# Patient Record
Sex: MALE | Race: BLACK OR AFRICAN AMERICAN | NOT HISPANIC OR LATINO | URBAN - METROPOLITAN AREA
[De-identification: names, ages, dates, MRNs, and addresses within clinical notes are randomized per-mention and may not be internally consistent; named-entity substitution may affect disease eponyms.]

---

## 2017-09-15 ENCOUNTER — INPATIENT (INPATIENT)
Facility: HOSPITAL | Age: 62
LOS: 13 days | Discharge: ANOTHER HOSPITAL | End: 2017-09-29
Attending: INTERNAL MEDICINE | Admitting: INTERNAL MEDICINE
Payer: COMMERCIAL

## 2017-09-15 VITALS
HEART RATE: 75 BPM | RESPIRATION RATE: 17 BRPM | SYSTOLIC BLOOD PRESSURE: 139 MMHG | DIASTOLIC BLOOD PRESSURE: 94 MMHG | HEIGHT: 75 IN | OXYGEN SATURATION: 100 % | TEMPERATURE: 98 F | WEIGHT: 240.08 LBS

## 2017-09-15 DIAGNOSIS — Z98.890 OTHER SPECIFIED POSTPROCEDURAL STATES: Chronic | ICD-10-CM

## 2017-09-15 LAB
APTT BLD: 25.7 SEC — LOW (ref 27.5–37.4)
BASOPHILS # BLD AUTO: 0.1 K/UL — SIGNIFICANT CHANGE UP (ref 0–0.2)
BASOPHILS NFR BLD AUTO: 1.3 % — SIGNIFICANT CHANGE UP (ref 0–2)
EOSINOPHIL # BLD AUTO: 0.1 K/UL — SIGNIFICANT CHANGE UP (ref 0–0.5)
EOSINOPHIL NFR BLD AUTO: 1.4 % — SIGNIFICANT CHANGE UP (ref 0–6)
HCT VFR BLD CALC: 47.2 % — SIGNIFICANT CHANGE UP (ref 39–50)
HGB BLD-MCNC: 15.2 G/DL — SIGNIFICANT CHANGE UP (ref 13–17)
INR BLD: 1.04 RATIO — SIGNIFICANT CHANGE UP (ref 0.88–1.16)
LYMPHOCYTES # BLD AUTO: 1.8 K/UL — SIGNIFICANT CHANGE UP (ref 1–3.3)
LYMPHOCYTES # BLD AUTO: 25.4 % — SIGNIFICANT CHANGE UP (ref 13–44)
MCHC RBC-ENTMCNC: 32.2 GM/DL — SIGNIFICANT CHANGE UP (ref 32–36)
MCHC RBC-ENTMCNC: 32.2 PG — SIGNIFICANT CHANGE UP (ref 27–34)
MCV RBC AUTO: 100 FL — SIGNIFICANT CHANGE UP (ref 80–100)
MONOCYTES # BLD AUTO: 0.7 K/UL — SIGNIFICANT CHANGE UP (ref 0–0.9)
MONOCYTES NFR BLD AUTO: 9.6 % — SIGNIFICANT CHANGE UP (ref 2–14)
NEUTROPHILS # BLD AUTO: 4.4 K/UL — SIGNIFICANT CHANGE UP (ref 1.8–7.4)
NEUTROPHILS NFR BLD AUTO: 62.3 % — SIGNIFICANT CHANGE UP (ref 43–77)
PLATELET # BLD AUTO: 174 K/UL — SIGNIFICANT CHANGE UP (ref 150–400)
PROTHROM AB SERPL-ACNC: 11.3 SEC — SIGNIFICANT CHANGE UP (ref 9.8–12.7)
RBC # BLD: 4.72 M/UL — SIGNIFICANT CHANGE UP (ref 4.2–5.8)
RBC # FLD: 11.7 % — SIGNIFICANT CHANGE UP (ref 11–15)
WBC # BLD: 7 K/UL — SIGNIFICANT CHANGE UP (ref 3.8–10.5)
WBC # FLD AUTO: 7 K/UL — SIGNIFICANT CHANGE UP (ref 3.8–10.5)

## 2017-09-15 PROCEDURE — 99285 EMERGENCY DEPT VISIT HI MDM: CPT

## 2017-09-15 RX ORDER — MORPHINE SULFATE 50 MG/1
4 CAPSULE, EXTENDED RELEASE ORAL ONCE
Qty: 0 | Refills: 0 | Status: DISCONTINUED | OUTPATIENT
Start: 2017-09-15 | End: 2017-09-15

## 2017-09-15 RX ORDER — SODIUM CHLORIDE 9 MG/ML
2000 INJECTION INTRAMUSCULAR; INTRAVENOUS; SUBCUTANEOUS ONCE
Qty: 0 | Refills: 0 | Status: COMPLETED | OUTPATIENT
Start: 2017-09-15 | End: 2017-09-15

## 2017-09-15 RX ORDER — IOHEXOL 300 MG/ML
30 INJECTION, SOLUTION INTRAVENOUS ONCE
Qty: 0 | Refills: 0 | Status: COMPLETED | OUTPATIENT
Start: 2017-09-15 | End: 2017-09-15

## 2017-09-15 RX ORDER — SUCRALFATE 1 G
1 TABLET ORAL ONCE
Qty: 0 | Refills: 0 | Status: COMPLETED | OUTPATIENT
Start: 2017-09-15 | End: 2017-09-15

## 2017-09-15 RX ORDER — FAMOTIDINE 10 MG/ML
20 INJECTION INTRAVENOUS ONCE
Qty: 0 | Refills: 0 | Status: COMPLETED | OUTPATIENT
Start: 2017-09-15 | End: 2017-09-15

## 2017-09-15 RX ADMIN — IOHEXOL 30 MILLILITER(S): 300 INJECTION, SOLUTION INTRAVENOUS at 23:48

## 2017-09-15 RX ADMIN — MORPHINE SULFATE 4 MILLIGRAM(S): 50 CAPSULE, EXTENDED RELEASE ORAL at 23:55

## 2017-09-15 RX ADMIN — Medication 30 MILLILITER(S): at 23:48

## 2017-09-15 RX ADMIN — FAMOTIDINE 20 MILLIGRAM(S): 10 INJECTION INTRAVENOUS at 23:48

## 2017-09-15 RX ADMIN — MORPHINE SULFATE 4 MILLIGRAM(S): 50 CAPSULE, EXTENDED RELEASE ORAL at 23:48

## 2017-09-15 RX ADMIN — Medication 1 GRAM(S): at 23:48

## 2017-09-15 RX ADMIN — SODIUM CHLORIDE 2000 MILLILITER(S): 9 INJECTION INTRAMUSCULAR; INTRAVENOUS; SUBCUTANEOUS at 23:46

## 2017-09-15 NOTE — ED ADULT TRIAGE NOTE - CHIEF COMPLAINT QUOTE
epigastric abdominal pain x 3 days, pain radiated to back went to urgent today, hx hernia surgery around 7 years ago

## 2017-09-15 NOTE — ED ADULT NURSE NOTE - CHPI ED SYMPTOMS NEG
no vomiting/no nausea/no diarrhea/no hematuria/no blood in stool/no chills/no burning urination/no abdominal distension/no dysuria/no fever

## 2017-09-15 NOTE — ED ADULT NURSE NOTE - OBJECTIVE STATEMENT
Patient reports "3 days of epigastric pain that gets worse every time I eat."  Occasionally pain "Wraps around my Right side to back." Described as "dull."

## 2017-09-16 DIAGNOSIS — K21.9 GASTRO-ESOPHAGEAL REFLUX DISEASE WITHOUT ESOPHAGITIS: ICD-10-CM

## 2017-09-16 DIAGNOSIS — K85.90 ACUTE PANCREATITIS WITHOUT NECROSIS OR INFECTION, UNSPECIFIED: ICD-10-CM

## 2017-09-16 DIAGNOSIS — Z29.9 ENCOUNTER FOR PROPHYLACTIC MEASURES, UNSPECIFIED: ICD-10-CM

## 2017-09-16 LAB
ALBUMIN SERPL ELPH-MCNC: 3.1 G/DL — LOW (ref 3.3–5)
ALBUMIN SERPL ELPH-MCNC: 3.6 G/DL — SIGNIFICANT CHANGE UP (ref 3.3–5)
ALBUMIN SERPL ELPH-MCNC: 3.8 G/DL — SIGNIFICANT CHANGE UP (ref 3.3–5)
ALP SERPL-CCNC: 106 U/L — SIGNIFICANT CHANGE UP (ref 40–120)
ALP SERPL-CCNC: 116 U/L — SIGNIFICANT CHANGE UP (ref 40–120)
ALP SERPL-CCNC: 119 U/L — SIGNIFICANT CHANGE UP (ref 40–120)
ALT FLD-CCNC: 116 U/L — HIGH (ref 12–78)
ALT FLD-CCNC: 135 U/L — HIGH (ref 12–78)
ALT FLD-CCNC: 141 U/L — HIGH (ref 12–78)
ANION GAP SERPL CALC-SCNC: 6 MMOL/L — SIGNIFICANT CHANGE UP (ref 5–17)
ANION GAP SERPL CALC-SCNC: 8 MMOL/L — SIGNIFICANT CHANGE UP (ref 5–17)
ANION GAP SERPL CALC-SCNC: 9 MMOL/L — SIGNIFICANT CHANGE UP (ref 5–17)
APPEARANCE UR: CLEAR — SIGNIFICANT CHANGE UP
AST SERPL-CCNC: 115 U/L — HIGH (ref 15–37)
AST SERPL-CCNC: 79 U/L — HIGH (ref 15–37)
AST SERPL-CCNC: 91 U/L — HIGH (ref 15–37)
BILIRUB SERPL-MCNC: 1.4 MG/DL — HIGH (ref 0.2–1.2)
BILIRUB SERPL-MCNC: 1.5 MG/DL — HIGH (ref 0.2–1.2)
BILIRUB SERPL-MCNC: 1.5 MG/DL — HIGH (ref 0.2–1.2)
BILIRUB UR-MCNC: NEGATIVE — SIGNIFICANT CHANGE UP
BUN SERPL-MCNC: 10 MG/DL — SIGNIFICANT CHANGE UP (ref 7–23)
BUN SERPL-MCNC: 10 MG/DL — SIGNIFICANT CHANGE UP (ref 7–23)
BUN SERPL-MCNC: 8 MG/DL — SIGNIFICANT CHANGE UP (ref 7–23)
CALCIUM SERPL-MCNC: 7.9 MG/DL — LOW (ref 8.5–10.1)
CALCIUM SERPL-MCNC: 8.7 MG/DL — SIGNIFICANT CHANGE UP (ref 8.5–10.1)
CALCIUM SERPL-MCNC: 9 MG/DL — SIGNIFICANT CHANGE UP (ref 8.5–10.1)
CHLORIDE SERPL-SCNC: 106 MMOL/L — SIGNIFICANT CHANGE UP (ref 96–108)
CHLORIDE SERPL-SCNC: 107 MMOL/L — SIGNIFICANT CHANGE UP (ref 96–108)
CHLORIDE SERPL-SCNC: 110 MMOL/L — HIGH (ref 96–108)
CO2 SERPL-SCNC: 23 MMOL/L — SIGNIFICANT CHANGE UP (ref 22–31)
CO2 SERPL-SCNC: 26 MMOL/L — SIGNIFICANT CHANGE UP (ref 22–31)
CO2 SERPL-SCNC: 27 MMOL/L — SIGNIFICANT CHANGE UP (ref 22–31)
COLOR SPEC: YELLOW — SIGNIFICANT CHANGE UP
CREAT SERPL-MCNC: 1.08 MG/DL — SIGNIFICANT CHANGE UP (ref 0.5–1.3)
CREAT SERPL-MCNC: 1.1 MG/DL — SIGNIFICANT CHANGE UP (ref 0.5–1.3)
CREAT SERPL-MCNC: 1.14 MG/DL — SIGNIFICANT CHANGE UP (ref 0.5–1.3)
DIFF PNL FLD: NEGATIVE — SIGNIFICANT CHANGE UP
GLUCOSE SERPL-MCNC: 90 MG/DL — SIGNIFICANT CHANGE UP (ref 70–99)
GLUCOSE SERPL-MCNC: 91 MG/DL — SIGNIFICANT CHANGE UP (ref 70–99)
GLUCOSE SERPL-MCNC: 92 MG/DL — SIGNIFICANT CHANGE UP (ref 70–99)
GLUCOSE UR QL: NEGATIVE MG/DL — SIGNIFICANT CHANGE UP
HCT VFR BLD CALC: 43.7 % — SIGNIFICANT CHANGE UP (ref 39–50)
HGB BLD-MCNC: 14.2 G/DL — SIGNIFICANT CHANGE UP (ref 13–17)
KETONES UR-MCNC: NEGATIVE — SIGNIFICANT CHANGE UP
LACTATE SERPL-SCNC: 1.2 MMOL/L — SIGNIFICANT CHANGE UP (ref 0.7–2)
LEUKOCYTE ESTERASE UR-ACNC: NEGATIVE — SIGNIFICANT CHANGE UP
LIDOCAIN IGE QN: 1015 U/L — HIGH (ref 73–393)
LIDOCAIN IGE QN: 866 U/L — HIGH (ref 73–393)
MCHC RBC-ENTMCNC: 32.4 GM/DL — SIGNIFICANT CHANGE UP (ref 32–36)
MCHC RBC-ENTMCNC: 32.4 PG — SIGNIFICANT CHANGE UP (ref 27–34)
MCV RBC AUTO: 100.1 FL — HIGH (ref 80–100)
NITRITE UR-MCNC: NEGATIVE — SIGNIFICANT CHANGE UP
PH UR: 6.5 — SIGNIFICANT CHANGE UP (ref 5–8)
PLATELET # BLD AUTO: 178 K/UL — SIGNIFICANT CHANGE UP (ref 150–400)
POTASSIUM SERPL-MCNC: 3.8 MMOL/L — SIGNIFICANT CHANGE UP (ref 3.5–5.3)
POTASSIUM SERPL-MCNC: 4.3 MMOL/L — SIGNIFICANT CHANGE UP (ref 3.5–5.3)
POTASSIUM SERPL-MCNC: 5 MMOL/L — SIGNIFICANT CHANGE UP (ref 3.5–5.3)
POTASSIUM SERPL-SCNC: 3.8 MMOL/L — SIGNIFICANT CHANGE UP (ref 3.5–5.3)
POTASSIUM SERPL-SCNC: 4.3 MMOL/L — SIGNIFICANT CHANGE UP (ref 3.5–5.3)
POTASSIUM SERPL-SCNC: 5 MMOL/L — SIGNIFICANT CHANGE UP (ref 3.5–5.3)
PROT SERPL-MCNC: 6.5 GM/DL — SIGNIFICANT CHANGE UP (ref 6–8.3)
PROT SERPL-MCNC: 7.4 GM/DL — SIGNIFICANT CHANGE UP (ref 6–8.3)
PROT SERPL-MCNC: 7.9 GM/DL — SIGNIFICANT CHANGE UP (ref 6–8.3)
PROT UR-MCNC: NEGATIVE MG/DL — SIGNIFICANT CHANGE UP
RBC # BLD: 4.36 M/UL — SIGNIFICANT CHANGE UP (ref 4.2–5.8)
RBC # FLD: 11.6 % — SIGNIFICANT CHANGE UP (ref 11–15)
SODIUM SERPL-SCNC: 138 MMOL/L — SIGNIFICANT CHANGE UP (ref 135–145)
SODIUM SERPL-SCNC: 142 MMOL/L — SIGNIFICANT CHANGE UP (ref 135–145)
SODIUM SERPL-SCNC: 142 MMOL/L — SIGNIFICANT CHANGE UP (ref 135–145)
SP GR SPEC: 1 — LOW (ref 1.01–1.02)
UROBILINOGEN FLD QL: NEGATIVE MG/DL — SIGNIFICANT CHANGE UP
WBC # BLD: 6 K/UL — SIGNIFICANT CHANGE UP (ref 3.8–10.5)
WBC # FLD AUTO: 6 K/UL — SIGNIFICANT CHANGE UP (ref 3.8–10.5)

## 2017-09-16 PROCEDURE — 76700 US EXAM ABDOM COMPLETE: CPT | Mod: 26

## 2017-09-16 PROCEDURE — 99222 1ST HOSP IP/OBS MODERATE 55: CPT

## 2017-09-16 PROCEDURE — 12345: CPT | Mod: NC

## 2017-09-16 PROCEDURE — 74177 CT ABD & PELVIS W/CONTRAST: CPT | Mod: 26

## 2017-09-16 RX ORDER — SODIUM CHLORIDE 9 MG/ML
1000 INJECTION INTRAMUSCULAR; INTRAVENOUS; SUBCUTANEOUS
Qty: 0 | Refills: 0 | Status: DISCONTINUED | OUTPATIENT
Start: 2017-09-16 | End: 2017-09-21

## 2017-09-16 RX ORDER — PANTOPRAZOLE SODIUM 20 MG/1
40 TABLET, DELAYED RELEASE ORAL DAILY
Qty: 0 | Refills: 0 | Status: DISCONTINUED | OUTPATIENT
Start: 2017-09-16 | End: 2017-09-29

## 2017-09-16 RX ORDER — MORPHINE SULFATE 50 MG/1
2 CAPSULE, EXTENDED RELEASE ORAL EVERY 4 HOURS
Qty: 0 | Refills: 0 | Status: DISCONTINUED | OUTPATIENT
Start: 2017-09-16 | End: 2017-09-21

## 2017-09-16 RX ADMIN — PANTOPRAZOLE SODIUM 40 MILLIGRAM(S): 20 TABLET, DELAYED RELEASE ORAL at 04:19

## 2017-09-16 RX ADMIN — SODIUM CHLORIDE 100 MILLILITER(S): 9 INJECTION INTRAMUSCULAR; INTRAVENOUS; SUBCUTANEOUS at 04:14

## 2017-09-16 RX ADMIN — MORPHINE SULFATE 2 MILLIGRAM(S): 50 CAPSULE, EXTENDED RELEASE ORAL at 04:23

## 2017-09-16 RX ADMIN — PANTOPRAZOLE SODIUM 40 MILLIGRAM(S): 20 TABLET, DELAYED RELEASE ORAL at 11:44

## 2017-09-16 RX ADMIN — MORPHINE SULFATE 2 MILLIGRAM(S): 50 CAPSULE, EXTENDED RELEASE ORAL at 04:19

## 2017-09-16 RX ADMIN — SODIUM CHLORIDE 100 MILLILITER(S): 9 INJECTION INTRAMUSCULAR; INTRAVENOUS; SUBCUTANEOUS at 20:54

## 2017-09-16 NOTE — ED PROVIDER NOTE - MEDICAL DECISION MAKING DETAILS
Patient presnt eith epigastric pain.  VSS.  lab values c/w pancreatitis.  CT scan demonstrates no acute pathology. Patient still with pain.  d/w Dr. Fischer and will admit. Uneventful ED observation period. Non toxic.

## 2017-09-16 NOTE — CHART NOTE - NSCHARTNOTEFT_GEN_A_CORE
patient with no past medical history except daily 32 ounce beer drinker presents with mild pancreatits with n o further vomiting or abdominal pain . ct confirms pancreatitis and us of gallbladder was NEGATIVE for stones. I told patient that he should be abstinent from alchohol as it is the most likely cause of the pancreatitis

## 2017-09-16 NOTE — H&P ADULT - PROBLEM SELECTOR PLAN 1
admit to medicine  iv hydration  npo  gi consult  pt counseled on etoh cessation and pt agrees  Abdominal US  no indication for abx

## 2017-09-16 NOTE — H&P ADULT - NSHPPHYSICALEXAM_GEN_ALL_CORE
Vital Signs Last 24 Hrs  T(C): 36.6 (16 Sep 2017 03:46), Max: 36.7 (15 Sep 2017 21:38)  T(F): 97.9 (16 Sep 2017 03:46), Max: 98 (15 Sep 2017 21:38)  HR: 65 (16 Sep 2017 03:46) (65 - 81)  BP: 136/76 (16 Sep 2017 03:46) (136/76 - 155/99)  BP(mean): --  RR: 17 (16 Sep 2017 03:46) (17 - 18)  SpO2: 95% (16 Sep 2017 03:46) (95% - 100%)    PHYSICAL EXAM:    GENERAL: NAD, well-groomed, well-developed  HEAD:  Atraumatic, Normocephalic  EYES: EOMI, PERRLA, conjunctiva and sclera clear  ENMT: No tonsillar erythema, exudates, or enlargement; Moist mucous membranes, No lesions  NECK: Supple, No JVD, Normal thyroid  NERVOUS SYSTEM:  Alert & Oriented X3, Good concentration; Motor Strength 5/5 B/L upper and lower extremities; DTRs 2+ intact and symmetric  CHEST/LUNG: Clear to percussion bilaterally; No rales, rhonchi, wheezing, or rubs  HEART: Regular rate and rhythm; No rubs, or gallops, +S1,S2  ABDOMEN: Soft, mild tenderness ruq and epigastric +wyatt, no rebound or guarding  EXTREMITIES:  2+ Peripheral Pulses, No clubbing, cyanosis, or edema  LYMPH: No cervical adenopathy  RECTAL: deferred  BREAST: No palpatble masses, skin no lesions   : deferred  SKIN: No rashes or lesions

## 2017-09-16 NOTE — ED PROVIDER NOTE - PHYSICAL EXAMINATION
Gen: Alert, NAD Head: NC, AT, PERRL, EOMI, normal lids/conjunctiva ENT: normal hearing, patent oropharynx without erythema/exudate, uvula midline  Neck: +supple, no tenderness/meningismus/JVD, +Trachea midlinePulm: Bilateral BS, normal resp effort, no wheeze/stridor/retractions CV: RRR, no M/R/G, +dist pulses  Abd: soft,TTP epigastrium, otherwise  NT/ND, +BS, no hepatosplenomegalyMskel: no edema/erythema/cyanosis  Skin: no rash Neuro: AAOx3, no sensory/motor deficits, CN 2-12 intact

## 2017-09-16 NOTE — H&P ADULT - NSHPLABSRESULTS_GEN_ALL_CORE
LABS:                        15.2   7.0   )-----------( 174      ( 15 Sep 2017 23:44 )             47.2     09-16    142  |  106  |  10  ----------------------------<  92  4.3   |  27  |  1.14    Ca    8.7      16 Sep 2017 00:12    TPro  7.4  /  Alb  3.8  /  TBili  1.4<H>  /  DBili  x   /  AST  91<H>  /  ALT  135<H>  /  AlkPhos  116  09-16    PT/INR - ( 15 Sep 2017 23:44 )   PT: 11.3 sec;   INR: 1.04 ratio         PTT - ( 15 Sep 2017 23:44 )  PTT:25.7 sec    CAPILLARY BLOOD GLUCOSE          RADIOLOGY & ADDITIONAL TESTS:    Imaging Personally Reviewed:  [ X] YES  [ ] NO

## 2017-09-16 NOTE — H&P ADULT - HISTORY OF PRESENT ILLNESS
Pt. is a 63 y/o w/o significant pmhx except mild gerd, was in usoh till 2 days ago started to develop r-sided and epigastric pain exacerbated by food.  sx's were getting worse, pt denies such sx's in past.  Does report hx of gerd but those sx's very different and usually very mild states now was also getting pain radiating to r back at times which made him seek care.  In ed has lipase of 1015.   Pt denies  any fever, chills, sob, cp palpitations n/v/d/c. no sick contacts or travels. pt does report etoh ~2 beers qd (32oz) states occasionally more, but never drunk.

## 2017-09-16 NOTE — ED PROVIDER NOTE - OBJECTIVE STATEMENT
Pertinent PMH/PSH/FHx/SHx and Review of Systems contained within:  Patient presents to the ED for epgigastric pain.  PMH of right pneumo decades ago, GERD. VSS.  Sx for 2 days.  worsened with food.  otherwise baseline.  ???EtOH??? no intox today. family bedside.  otherwsei well.  Non toxic.  Well appearing. No aggravating or relieving factors. No other pertinent PMH.  No other pertinent PSH.  No other pertinent FHx.  Patient denies EtOH/tobacco/illicit substance use. No fever/chills, No photophobia/eye pain/changes in vision, No ear pain/sore throat/dysphagia, No chest pain/palpitations, no SOB/cough/wheeze/stridor, No N/V/D, no dysuria/frequency/discharge, No neck/back pain, no rash, no changes in neurological status/function.

## 2017-09-17 LAB — LIDOCAIN IGE QN: 840 U/L — HIGH (ref 73–393)

## 2017-09-17 PROCEDURE — 99233 SBSQ HOSP IP/OBS HIGH 50: CPT

## 2017-09-17 RX ADMIN — MORPHINE SULFATE 2 MILLIGRAM(S): 50 CAPSULE, EXTENDED RELEASE ORAL at 17:37

## 2017-09-17 RX ADMIN — SODIUM CHLORIDE 100 MILLILITER(S): 9 INJECTION INTRAMUSCULAR; INTRAVENOUS; SUBCUTANEOUS at 06:51

## 2017-09-17 RX ADMIN — PANTOPRAZOLE SODIUM 40 MILLIGRAM(S): 20 TABLET, DELAYED RELEASE ORAL at 12:22

## 2017-09-17 RX ADMIN — MORPHINE SULFATE 2 MILLIGRAM(S): 50 CAPSULE, EXTENDED RELEASE ORAL at 17:55

## 2017-09-17 NOTE — PROGRESS NOTE ADULT - PROBLEM SELECTOR PLAN 1
admit to medicine  iv hydration  npo  gi consult  pt counseled on etoh cessation and pt agrees  Abdominal US  no indication for antibiotics  analgesics

## 2017-09-17 NOTE — PROGRESS NOTE ADULT - SUBJECTIVE AND OBJECTIVE BOX
Pt feels a little better - c/o mid -epigastric tenderness      MEDICATIONS  (STANDING):  pantoprazole  Injectable 40 milliGRAM(s) IV Push daily  sodium chloride 0.9%. 1000 milliLiter(s) (100 mL/Hr) IV Continuous <Continuous>    MEDICATIONS  (PRN):  morphine  - Injectable 2 milliGRAM(s) IV Push every 4 hours PRN Moderate Pain (4 - 6)      Allergies    Drug Allergies Not Recorded  dust (Sneezing)    Intolerances        Vital Signs Last 24 Hrs  T(C): 36.6 (17 Sep 2017 05:20), Max: 37 (16 Sep 2017 23:20)  T(F): 97.8 (17 Sep 2017 05:20), Max: 98.6 (16 Sep 2017 23:20)  HR: 69 (17 Sep 2017 05:20) (65 - 73)  BP: 141/92 (17 Sep 2017 05:20) (135/85 - 149/88)  BP(mean): --  RR: 16 (17 Sep 2017 05:20) (16 - 16)  SpO2: 99% (17 Sep 2017 05:20) (97% - 99%)    PHYSICAL EXAM:  General: NAD.  CVS: S1, S2  Chest: air entry bilaterally present  Abd: BS present, soft, non-tender      LABS:                        14.2   6.0   )-----------( 178      ( 16 Sep 2017 05:12 )             43.7     09-16    142  |  110<H>  |  8   ----------------------------<  91  3.8   |  26  |  1.08    Ca    7.9<L>      16 Sep 2017 05:12    TPro  6.5  /  Alb  3.1<L>  /  TBili  1.5<H>  /  DBili  x   /  AST  79<H>  /  ALT  116<H>  /  AlkPhos  106  09-16    PT/INR - ( 15 Sep 2017 23:44 )   PT: 11.3 sec;   INR: 1.04 ratio         PTT - ( 15 Sep 2017 23:44 )  PTT:25.7 sec    Lipase, Serum: 840 U/L (09.17.17 @ 06:37)      on clear liquids  protonix  lipase in am

## 2017-09-17 NOTE — PROGRESS NOTE ADULT - SUBJECTIVE AND OBJECTIVE BOX
CHIEF COMPLAINT/INTERVAL HISTORY:    Patient is a 62y old  Male who presents with a chief complaint of abdominal pain (16 Sep 2017 04:00)      HPI:  Pt. is a 61 y/o w/o significant pmhx except mild gerd, was in usoh till 2 days ago started to develop r-sided and epigastric pain exacerbated by food.  sx's were getting worse, pt denies such sx's in past.  Does report hx of gerd but those sx's very different and usually very mild states now was also getting pain radiating to r back at times which made him seek care.  In ed has lipase of 1015.   Pt denies  any fever, chills, sob, cp palpitations n/v/d/c. no sick contacts or travels. pt does report etoh ~2 beers qd (32oz) states occasionally more, but never drunk. (16 Sep 2017 04:00)    Overnight issues  clinically improved  still with some pain   SUBJECTIVE & OBJECTIVE: Pt seen and examined at bedside.   ROS:  CONSTITUTIONAL: No fever, weight loss, or fatigue  NECK: No pain or stiffness  RESPIRATORY: No cough, wheezing, chills or hemoptysis; No shortness of breath  CARDIOVASCULAR: No chest pain, palpitations, dizziness, or leg swelling  GASTROINTESTINAL: midepigastric pain. No nausea, vomiting, or hematemesis; No diarrhea or constipation. No melena or hematochezia.  GENITOURINARY: No dysuria, frequency, hematuria, or incontinence  NEUROLOGICAL: No headaches, memory loss, loss of strength, numbness, or tremors  SKIN: No itching, burning, rashes, or lesions   ICU Vital Signs Last 24 Hrs  T(C): 36.7 (17 Sep 2017 11:10), Max: 37 (16 Sep 2017 23:20)  T(F): 98 (17 Sep 2017 11:10), Max: 98.6 (16 Sep 2017 23:20)  HR: 66 (17 Sep 2017 11:10) (65 - 73)  BP: 147/84 (17 Sep 2017 11:10) (135/85 - 149/88)  BP(mean): --  ABP: --  ABP(mean): --  RR: 16 (17 Sep 2017 11:10) (16 - 16)  SpO2: 95% (17 Sep 2017 11:10) (95% - 99%)        MEDICATIONS  (STANDING):  pantoprazole  Injectable 40 milliGRAM(s) IV Push daily  sodium chloride 0.9%. 1000 milliLiter(s) (100 mL/Hr) IV Continuous <Continuous>    MEDICATIONS  (PRN):  morphine  - Injectable 2 milliGRAM(s) IV Push every 4 hours PRN Moderate Pain (4 - 6)        PHYSICAL EXAM:    GENERAL: NAD, well-groomed, well-developed  HEAD:  Atraumatic, Normocephalic  EYES: EOMI, PERRLA, conjunctiva and sclera clear  ENMT: Moist mucous membranes  NECK: Supple, No JVD  NERVOUS SYSTEM:  Alert & Oriented X3, Motor Strength 5/5 B/L upper and lower extremities; DTRs 2+ intact and symmetric  CHEST/LUNG: Clear to auscultation bilaterally; No rales, rhonchi, wheezing, or rubs  HEART: Regular rate and rhythm; No murmurs, rubs, or gallops  ABDOMEN: Soft, Nontender, Nondistended; Bowel sounds present  EXTREMITIES:  2+ Peripheral Pulses, No clubbing, cyanosis, or edema    LABS:                        14.2   6.0   )-----------( 178      ( 16 Sep 2017 05:12 )             43.7         142  |  110<H>  |  8   ----------------------------<  91  3.8   |  26  |  1.08    Ca    7.9<L>      16 Sep 2017 05:12    TPro  6.5  /  Alb  3.1<L>  /  TBili  1.5<H>  /  DBili  x   /  AST  79<H>  /  ALT  116<H>  /  AlkPhos  106      PT/INR - ( 15 Sep 2017 23:44 )   PT: 11.3 sec;   INR: 1.04 ratio         PTT - ( 15 Sep 2017 23:44 )  PTT:25.7 sec  Urinalysis Basic - ( 16 Sep 2017 05:12 )    Color: Yellow / Appearance: Clear / S.005 / pH: x  Gluc: x / Ketone: Negative  / Bili: Negative / Urobili: Negative mg/dL   Blood: x / Protein: Negative mg/dL / Nitrite: Negative   Leuk Esterase: Negative / RBC: x / WBC x   Sq Epi: x / Non Sq Epi: x / Bacteria: x        CAPILLARY BLOOD GLUCOSE          RECENT CULTURES:      RADIOLOGY & ADDITIONAL TESTS:  Imaging Personally Reviewed:  [ ] YES      Consultant(s) Notes Reviewed:  [ ] YES     Care Discussed with [ ] Consultants [X ] Patient [ ] Family  [x ]    [x ]  Other; RN  HEALTH ISSUES - PROBLEM Dx:  Preventive measure: Preventive measure  Gastroesophageal reflux disease without esophagitis: Gastroesophageal reflux disease without esophagitis  Acute pancreatitis, unspecified complication status, unspecified pancreatitis type: Acute pancreatitis, unspecified complication status, unspecified pancreatitis type        DVT/GI ppx  Discussed with pt @ bedside

## 2017-09-18 LAB — LIDOCAIN IGE QN: 1001 U/L — HIGH (ref 73–393)

## 2017-09-18 PROCEDURE — 99233 SBSQ HOSP IP/OBS HIGH 50: CPT

## 2017-09-18 RX ADMIN — PANTOPRAZOLE SODIUM 40 MILLIGRAM(S): 20 TABLET, DELAYED RELEASE ORAL at 11:13

## 2017-09-18 NOTE — PROGRESS NOTE ADULT - SUBJECTIVE AND OBJECTIVE BOX
Pt feeling much better  improved on protonix    MEDICATIONS  (STANDING):  pantoprazole  Injectable 40 milliGRAM(s) IV Push daily  sodium chloride 0.9%. 1000 milliLiter(s) (100 mL/Hr) IV Continuous <Continuous>    MEDICATIONS  (PRN):  morphine  - Injectable 2 milliGRAM(s) IV Push every 4 hours PRN Moderate Pain (4 - 6)      Allergies    Drug Allergies Not Recorded  dust (Sneezing)    Intolerances        Vital Signs Last 24 Hrs  T(C): 37.1 (18 Sep 2017 05:20), Max: 37.1 (18 Sep 2017 05:20)  T(F): 98.8 (18 Sep 2017 05:20), Max: 98.8 (18 Sep 2017 05:20)  HR: 71 (18 Sep 2017 05:20) (66 - 76)  BP: 136/83 (18 Sep 2017 05:20) (132/89 - 147/84)  BP(mean): --  RR: 16 (18 Sep 2017 05:20) (16 - 17)  SpO2: 98% (18 Sep 2017 05:20) (95% - 99%)    PHYSICAL EXAM:  General: NAD.  CVS: S1, S2  Chest: air entry bilaterally present  Abd: BS present, soft, mild mid epig tenderness      LABS:    Lipase, Serum: 1001 U/L (09.18.17 @ 06:14)    on clear liquids  follow lipase

## 2017-09-18 NOTE — PROGRESS NOTE ADULT - SUBJECTIVE AND OBJECTIVE BOX
CHIEF COMPLAINT/INTERVAL HISTORY:    Patient is a 62y old  Male who presents with a chief complaint of abdominal pain (16 Sep 2017 04:00)      HPI:  Pt. is a 61 y/o w/o significant pmhx except mild gerd, was in usoh till 2 days ago started to develop r-sided and epigastric pain exacerbated by food.  sx's were getting worse, pt denies such sx's in past.  Does report hx of gerd but those sx's very different and usually very mild states now was also getting pain radiating to r back at times which made him seek care.  In ed has lipase of 1015.   Pt denies  any fever, chills, sob, cp palpitations n/v/d/c. no sick contacts or travels. pt does report etoh ~2 beers qd (32oz) states occasionally more, but never drunk. (16 Sep 2017 04:00)    Overnight issues  still with mild pain and tenderness and increased lipase   SUBJECTIVE & OBJECTIVE: Pt seen and examined at bedside.   ROS:  CONSTITUTIONAL: No fever, weight loss, or fatigue  NECK: No pain or stiffness  RESPIRATORY: No cough, wheezing, chills or hemoptysis; No shortness of breath  CARDIOVASCULAR: No chest pain, palpitations, dizziness, or leg swelling  GASTROINTESTINAL: moderate midepigastric pain. No nausea, vomiting, or hematemesis; No diarrhea or constipation. No melena or hematochezia.  GENITOURINARY: No dysuria, frequency, hematuria, or incontinence  NEUROLOGICAL: No headaches, memory loss, loss of strength, numbness, or tremors  SKIN: No itching, burning, rashes, or lesions   ICU Vital Signs Last 24 Hrs  T(C): 36.9 (18 Sep 2017 11:42), Max: 37.1 (18 Sep 2017 05:20)  T(F): 98.4 (18 Sep 2017 11:42), Max: 98.8 (18 Sep 2017 05:20)  HR: 71 (18 Sep 2017 11:42) (68 - 76)  BP: 127/83 (18 Sep 2017 11:42) (127/83 - 143/87)  BP(mean): --  ABP: --  ABP(mean): --  RR: 19 (18 Sep 2017 11:42) (16 - 19)  SpO2: 99% (18 Sep 2017 11:42) (98% - 99%)        MEDICATIONS  (STANDING):  pantoprazole  Injectable 40 milliGRAM(s) IV Push daily  sodium chloride 0.9%. 1000 milliLiter(s) (100 mL/Hr) IV Continuous <Continuous>    MEDICATIONS  (PRN):  morphine  - Injectable 2 milliGRAM(s) IV Push every 4 hours PRN Moderate Pain (4 - 6)        PHYSICAL EXAM:    GENERAL: NAD, well-groomed, well-developed  HEAD:  Atraumatic, Normocephalic  EYES: EOMI, PERRLA, conjunctiva and sclera clear  ENMT: Moist mucous membranes  NECK: Supple, No JVD  NERVOUS SYSTEM:  Alert & Oriented X3, Motor Strength 5/5 B/L upper and lower extremities; DTRs 2+ intact and symmetric  CHEST/LUNG: Clear to auscultation bilaterally; No rales, rhonchi, wheezing, or rubs  HEART: Regular rate and rhythm; No murmurs, rubs, or gallops  ABDOMEN: Soft, mild tenderness midepigastric area  Nondistended; Bowel sounds present  EXTREMITIES:  2+ Peripheral Pulses, No clubbing, cyanosis, or edema    LABS:                CAPILLARY BLOOD GLUCOSE          RECENT CULTURES:      RADIOLOGY & ADDITIONAL TESTS:  Imaging Personally Reviewed:  [ ] YES      Consultant(s) Notes Reviewed:  [ ] YES     Care Discussed with [ ] Consultants [X ] Patient [ ] Family  [x ]    [x ]  Other; RN  HEALTH ISSUES - PROBLEM Dx:  Preventive measure: Preventive measure  Gastroesophageal reflux disease without esophagitis: Gastroesophageal reflux disease without esophagitis  Acute pancreatitis, unspecified complication status, unspecified pancreatitis type: Acute pancreatitis, unspecified complication status, unspecified pancreatitis type        DVT/GI ppx  Discussed with pt @ bedside

## 2017-09-19 LAB
ANION GAP SERPL CALC-SCNC: 9 MMOL/L — SIGNIFICANT CHANGE UP (ref 5–17)
BUN SERPL-MCNC: 6 MG/DL — LOW (ref 7–23)
CALCIUM SERPL-MCNC: 8.9 MG/DL — SIGNIFICANT CHANGE UP (ref 8.5–10.1)
CHLORIDE SERPL-SCNC: 106 MMOL/L — SIGNIFICANT CHANGE UP (ref 96–108)
CO2 SERPL-SCNC: 26 MMOL/L — SIGNIFICANT CHANGE UP (ref 22–31)
CREAT SERPL-MCNC: 1.05 MG/DL — SIGNIFICANT CHANGE UP (ref 0.5–1.3)
GLUCOSE SERPL-MCNC: 91 MG/DL — SIGNIFICANT CHANGE UP (ref 70–99)
LIDOCAIN IGE QN: 1278 U/L — HIGH (ref 73–393)
POTASSIUM SERPL-MCNC: 3.7 MMOL/L — SIGNIFICANT CHANGE UP (ref 3.5–5.3)
POTASSIUM SERPL-SCNC: 3.7 MMOL/L — SIGNIFICANT CHANGE UP (ref 3.5–5.3)
SODIUM SERPL-SCNC: 141 MMOL/L — SIGNIFICANT CHANGE UP (ref 135–145)

## 2017-09-19 PROCEDURE — 99231 SBSQ HOSP IP/OBS SF/LOW 25: CPT

## 2017-09-19 RX ORDER — SODIUM CHLORIDE 9 MG/ML
1000 INJECTION INTRAMUSCULAR; INTRAVENOUS; SUBCUTANEOUS ONCE
Qty: 0 | Refills: 0 | Status: COMPLETED | OUTPATIENT
Start: 2017-09-19 | End: 2017-09-19

## 2017-09-19 RX ORDER — SODIUM CHLORIDE 9 MG/ML
1000 INJECTION INTRAMUSCULAR; INTRAVENOUS; SUBCUTANEOUS
Qty: 0 | Refills: 0 | Status: DISCONTINUED | OUTPATIENT
Start: 2017-09-19 | End: 2017-09-21

## 2017-09-19 RX ADMIN — SODIUM CHLORIDE 2000 MILLILITER(S): 9 INJECTION INTRAMUSCULAR; INTRAVENOUS; SUBCUTANEOUS at 22:46

## 2017-09-19 RX ADMIN — PANTOPRAZOLE SODIUM 40 MILLIGRAM(S): 20 TABLET, DELAYED RELEASE ORAL at 12:24

## 2017-09-19 NOTE — PROGRESS NOTE ADULT - PROBLEM SELECTOR PLAN 1
admit to medicine  iv hydration  npo  gi consult  pt counseled on etoh cessation and pt agrees  Abdominal US noted   ct scan abd noted    mild rising of lipase    Gi consult  noted iv hydration  npo  gi consult noted    abd pain improved but GI concerned about elevation in lipase although clinically no abd pain. request pt to be npo for today and follow up labs in am     pt counseled on etoh cessation and pt agrees  Abdominal US noted   ct scan abd noted    mild rising of lipase    Gi consult  noted

## 2017-09-19 NOTE — PROGRESS NOTE ADULT - SUBJECTIVE AND OBJECTIVE BOX
Pt feeling much better - denies abd pain    MEDICATIONS  (STANDING):  pantoprazole  Injectable 40 milliGRAM(s) IV Push daily  sodium chloride 0.9%. 1000 milliLiter(s) (100 mL/Hr) IV Continuous <Continuous>    MEDICATIONS  (PRN):  morphine  - Injectable 2 milliGRAM(s) IV Push every 4 hours PRN Moderate Pain (4 - 6)      Allergies    Drug Allergies Not Recorded  dust (Sneezing)    Intolerances        Vital Signs Last 24 Hrs  T(C): 36.3 (19 Sep 2017 11:23), Max: 37.1 (18 Sep 2017 23:30)  T(F): 97.3 (19 Sep 2017 11:23), Max: 98.8 (18 Sep 2017 23:30)  HR: 71 (19 Sep 2017 11:23) (68 - 85)  BP: 137/83 (19 Sep 2017 11:23) (112/69 - 137/83)  BP(mean): --  RR: 17 (19 Sep 2017 11:23) (16 - 19)  SpO2: 97% (19 Sep 2017 11:23) (96% - 100%)    PHYSICAL EXAM:  General: NAD.  CVS: S1, S2  Chest: air entry bilaterally present  Abd: BS present, soft, non-tender      LABS:    09-19    141  |  106  |  6<L>  ----------------------------<  91  3.7   |  26  |  1.05    Ca    8.9      19 Sep 2017 06:27      Lipase, Serum: 1278 U/L (09.19.17 @ 06:27)  will change diet to NPO - concerned about rising Lipase

## 2017-09-19 NOTE — PROGRESS NOTE ADULT - SUBJECTIVE AND OBJECTIVE BOX
Patient is a 62y old  Male who presents with a chief complaint of abdominal pain (16 Sep 2017 04:00)      OVERNIGHT EVENTS:      REVIEW OF SYSTEMS: denies chest pain/SOB, diaphoresis, no F/C, cough, dizziness, headache, blurry vision, nausea, vomiting, abdominal pain. Rest unremarkable     MEDICATIONS  (STANDING):  pantoprazole  Injectable 40 milliGRAM(s) IV Push daily  sodium chloride 0.9%. 1000 milliLiter(s) (100 mL/Hr) IV Continuous <Continuous>    MEDICATIONS  (PRN):  morphine  - Injectable 2 milliGRAM(s) IV Push every 4 hours PRN Moderate Pain (4 - 6)      Allergies    Drug Allergies Not Recorded  dust (Sneezing)    Intolerances        SUBJECTIVE: in bed in NAD, no acute events overnight     T(F): 97.3 (09-19-17 @ 11:23), Max: 98.8 (09-18-17 @ 23:30)  HR: 71 (09-19-17 @ 11:23) (68 - 85)  BP: 137/83 (09-19-17 @ 11:23) (112/69 - 137/83)  RR: 17 (09-19-17 @ 11:23) (16 - 18)  SpO2: 97% (09-19-17 @ 11:23) (96% - 100%)  Wt(kg): --    PHYSICAL EXAM:  GENERAL: NAD, well-groomed, well-developed  HEAD:  Atraumatic, Normocephalic  EYES: EOMI, PERRLA, conjunctiva and sclera clear  ENMT: No tonsillar erythema, exudates, or enlargement; Moist mucous membranes, Good dentition, No lesions  NECK: Supple, No JVD, Normal thyroid  CHEST/LUNG: Clear to  auscultation bilaterally; No rales, rhonchi, wheezing, or rubs  bilaterally  HEART: Regular rate and rhythm; No murmurs, rubs, or gallops  ABDOMEN: Soft, Nontender, Nondistended; Bowel sounds present  EXTREMITIES:  2+ Peripheral Pulses, No clubbing, cyanosis, or edema BL LE  LYMPH: No lymphadenopathy noted  SKIN: No rashes or lesions  NERVOUS SYSTEM:  Alert & Oriented X3, Good concentration; Motor Strength 5/5 B/L upper and lower extremities;   DTRs 2+ intact and symmetric, sensation intact BL    LABS:    09-19    141  |  106  |  6<L>  ----------------------------<  91  3.7   |  26  |  1.05    Ca    8.9      19 Sep 2017 06:27    Lipase, Serum in AM (09.19.17 @ 06:27)    Lipase, Serum: 1278 U/L          Cultures;   CAPILLARY BLOOD GLUCOSE        Lipid panel:           RADIOLOGY & ADDITIONAL TESTS:    < from: US Abdomen Complete (09.16.17 @ 11:11) >  IMPRESSION:    Contracted gallbladder.  No biliary ductal dilatation.          < from: CT Abdomen and Pelvis w/ Oral Cont and w/ IV Cont (09.16.17 @ 02:08) >  IMPRESSION:    No CT evidence of pancreatitis.              Imaging Personally Reviewed:  [ ] YES      Consultant(s) Notes Reviewed:  [ ] YES     Care Discussed with [ ] Consultants [X ] Patient [ ] Family  [x ]    [x ]  Other; RN

## 2017-09-20 LAB
ALBUMIN SERPL ELPH-MCNC: 3.1 G/DL — LOW (ref 3.3–5)
ALP SERPL-CCNC: 286 U/L — HIGH (ref 40–120)
ALT FLD-CCNC: 137 U/L — HIGH (ref 12–78)
ANION GAP SERPL CALC-SCNC: 9 MMOL/L — SIGNIFICANT CHANGE UP (ref 5–17)
AST SERPL-CCNC: 79 U/L — HIGH (ref 15–37)
BILIRUB SERPL-MCNC: 2.3 MG/DL — HIGH (ref 0.2–1.2)
BUN SERPL-MCNC: 8 MG/DL — SIGNIFICANT CHANGE UP (ref 7–23)
CALCIUM SERPL-MCNC: 8.7 MG/DL — SIGNIFICANT CHANGE UP (ref 8.5–10.1)
CHLORIDE SERPL-SCNC: 108 MMOL/L — SIGNIFICANT CHANGE UP (ref 96–108)
CO2 SERPL-SCNC: 24 MMOL/L — SIGNIFICANT CHANGE UP (ref 22–31)
CREAT SERPL-MCNC: 1.26 MG/DL — SIGNIFICANT CHANGE UP (ref 0.5–1.3)
GLUCOSE SERPL-MCNC: 75 MG/DL — SIGNIFICANT CHANGE UP (ref 70–99)
LIDOCAIN IGE QN: 1403 U/L — HIGH (ref 73–393)
MAGNESIUM SERPL-MCNC: 2.3 MG/DL — SIGNIFICANT CHANGE UP (ref 1.6–2.6)
PHOSPHATE SERPL-MCNC: 2.8 MG/DL — SIGNIFICANT CHANGE UP (ref 2.5–4.5)
POTASSIUM SERPL-MCNC: 3.9 MMOL/L — SIGNIFICANT CHANGE UP (ref 3.5–5.3)
POTASSIUM SERPL-SCNC: 3.9 MMOL/L — SIGNIFICANT CHANGE UP (ref 3.5–5.3)
PROT SERPL-MCNC: 7.2 GM/DL — SIGNIFICANT CHANGE UP (ref 6–8.3)
SODIUM SERPL-SCNC: 141 MMOL/L — SIGNIFICANT CHANGE UP (ref 135–145)

## 2017-09-20 PROCEDURE — 74183 MRI ABD W/O CNTR FLWD CNTR: CPT | Mod: 26

## 2017-09-20 PROCEDURE — 99231 SBSQ HOSP IP/OBS SF/LOW 25: CPT

## 2017-09-20 RX ADMIN — SODIUM CHLORIDE 100 MILLILITER(S): 9 INJECTION INTRAMUSCULAR; INTRAVENOUS; SUBCUTANEOUS at 12:16

## 2017-09-20 RX ADMIN — SODIUM CHLORIDE 100 MILLILITER(S): 9 INJECTION INTRAMUSCULAR; INTRAVENOUS; SUBCUTANEOUS at 00:06

## 2017-09-20 RX ADMIN — PANTOPRAZOLE SODIUM 40 MILLIGRAM(S): 20 TABLET, DELAYED RELEASE ORAL at 12:11

## 2017-09-20 NOTE — DIETITIAN INITIAL EVALUATION ADULT. - NS AS NUTRI INTERV ED CONTENT
assist the pt. in understanding the role of the pancreas and possible triggers for pancreatitis./Nutrition relationship to health/disease/Purpose of the nutrition education

## 2017-09-20 NOTE — PROGRESS NOTE ADULT - SUBJECTIVE AND OBJECTIVE BOX
Patient is a 62y old  Male who presents with a chief complaint of abdominal pain (16 Sep 2017 04:00)      OVERNIGHT EVENTS:      REVIEW OF SYSTEMS: denies chest pain/SOB, diaphoresis, no F/C, cough, dizziness, headache, blurry vision, nausea, vomiting, abdominal pain. Rest unremarkable     MEDICATIONS  (STANDING):  pantoprazole  Injectable 40 milliGRAM(s) IV Push daily  sodium chloride 0.9%. 1000 milliLiter(s) (100 mL/Hr) IV Continuous <Continuous>    MEDICATIONS  (PRN):  morphine  - Injectable 2 milliGRAM(s) IV Push every 4 hours PRN Moderate Pain (4 - 6)      Allergies    Drug Allergies Not Recorded  dust (Sneezing)    Intolerances        SUBJECTIVE: in bed in NAD, no acute events overnight     T(F): 97.3 (09-19-17 @ 11:23), Max: 98.8 (09-18-17 @ 23:30)  HR: 71 (09-19-17 @ 11:23) (68 - 85)  BP: 137/83 (09-19-17 @ 11:23) (112/69 - 137/83)  RR: 17 (09-19-17 @ 11:23) (16 - 18)  SpO2: 97% (09-19-17 @ 11:23) (96% - 100%)  Wt(kg): --    PHYSICAL EXAM:  GENERAL: NAD, well-groomed, well-developed  HEAD:  Atraumatic, Normocephalic  EYES: EOMI, PERRLA, conjunctiva and sclera clear  ENMT: No tonsillar erythema, exudates, or enlargement; Moist mucous membranes, Good dentition, No lesions  NECK: Supple, No JVD, Normal thyroid  CHEST/LUNG: Clear to  auscultation bilaterally; No rales, rhonchi, wheezing, or rubs  bilaterally  HEART: Regular rate and rhythm; No murmurs, rubs, or gallops  ABDOMEN: Soft, Nontender, Nondistended; Bowel sounds present  EXTREMITIES:  2+ Peripheral Pulses, No clubbing, cyanosis, or edema BL LE  LYMPH: No lymphadenopathy noted  SKIN: No rashes or lesions  NERVOUS SYSTEM:  Alert & Oriented X3, Good concentration; Motor Strength 5/5 B/L upper and lower extremities;   DTRs 2+ intact and symmetric, sensation intact BL    LABS:  09-20    141  |  108  |  8   ----------------------------<  75  3.9   |  24  |  1.26    Ca    8.7      20 Sep 2017 06:58  Phos  2.8     09-20  Mg     2.3     09-20    TPro  7.2  /  Alb  3.1<L>  /  TBili  2.3<H>  /  DBili  x   /  AST  79<H>  /  ALT  137<H>  /  AlkPhos  286<H>  09-20    Lipase, Serum in AM (09.20.17 @ 06:58)    Lipase, Serum: 1403 U/L          Cultures;   CAPILLARY BLOOD GLUCOSE        Lipid panel:           RADIOLOGY & ADDITIONAL TESTS:    < from: US Abdomen Complete (09.16.17 @ 11:11) >  IMPRESSION:    Contracted gallbladder.  No biliary ductal dilatation.          < from: CT Abdomen and Pelvis w/ Oral Cont and w/ IV Cont (09.16.17 @ 02:08) >  IMPRESSION:    No CT evidence of pancreatitis.              Imaging Personally Reviewed:  [ ] YES      Consultant(s) Notes Reviewed:  [ ] YES     Care Discussed with [ ] Consultants [X ] Patient [ ] Family  [x ]    [x ]  Other; RN

## 2017-09-20 NOTE — DIETITIAN INITIAL EVALUATION ADULT. - OTHER INFO
Reason for visit: NPO/clears diet therapy x 4 days diet. PMH of GERD, hernia repair, chest tube. Admitted due to acute pancreatitis with epigastric pain radiating to the back worsens c food. Patient consumes 3 cans of beer daily. Normally eats large breakfast and dinner - [employer] (eggs, cereal, milk, fried dumplings, saltfish, tea, "cho cho"), tea, adrian chicken, ox tail, turkey wings, soupss and rice and peas) No Hx of n/v/c/d. Wife cooks and shops; no issues with chewing or swallowing c healthy appetite before admission. Reason for visit: NPO/clears diet therapy x 4 days diet. Pt reports to consume 3 cans of beer daily.  Normally eats large breakfast and dinner - [place] (eggs, cereal, milk, fried dumplings, salt fish, tea, "cho cho"), tea, adrian chicken, ox tail, turkey wings, soups and rice and peas).

## 2017-09-20 NOTE — DIETITIAN INITIAL EVALUATION ADULT. - NS AS NUTRI INTERV MEALS SNACK
NPO/clear nutrition therapy/Other (specify) Fat/lipid restricted diet should allow for 3552-3586 kcal/day to meet nutritional requirements based on IBW./Fat - modified diet

## 2017-09-20 NOTE — PROGRESS NOTE ADULT - SUBJECTIVE AND OBJECTIVE BOX
Pt comfortable but Lipase keeps rising      MEDICATIONS  (STANDING):  pantoprazole  Injectable 40 milliGRAM(s) IV Push daily  sodium chloride 0.9%. 1000 milliLiter(s) (100 mL/Hr) IV Continuous <Continuous>  sodium chloride 0.9%. 1000 milliLiter(s) (100 mL/Hr) IV Continuous <Continuous>    MEDICATIONS  (PRN):  morphine  - Injectable 2 milliGRAM(s) IV Push every 4 hours PRN Moderate Pain (4 - 6)      Allergies    dust (Sneezing)  No Known Drug Allergies    Intolerances        Vital Signs Last 24 Hrs  T(C): 37.1 (20 Sep 2017 05:50), Max: 37.2 (19 Sep 2017 23:10)  T(F): 98.8 (20 Sep 2017 05:50), Max: 99 (19 Sep 2017 23:10)  HR: 76 (20 Sep 2017 05:50) (70 - 76)  BP: 122/74 (20 Sep 2017 05:50) (122/74 - 156/96)  BP(mean): --  RR: 16 (20 Sep 2017 05:50) (16 - 17)  SpO2: 98% (20 Sep 2017 05:50) (97% - 98%)    PHYSICAL EXAM:  General: NAD.  CVS: S1, S2  Chest: air entry bilaterally present  Abd: BS present, soft, non-tender      LABS:    09-20    141  |  108  |  8   ----------------------------<  75  3.9   |  24  |  1.26    Ca    8.7      20 Sep 2017 06:58  Phos  2.8     09-20  Mg     2.3     09-20    TPro  7.2  /  Alb  3.1<L>  /  TBili  2.3<H>  /  DBili  x   /  AST  79<H>  /  ALT  137<H>  /  AlkPhos  286<H>  09-20      Lipase, Serum: 1403 U/L (09.20.17 @ 06:58)    will order MRI -

## 2017-09-20 NOTE — PROGRESS NOTE ADULT - PROBLEM SELECTOR PLAN 1
iv hydration  npo  gi consult noted    abd pain improved but GI concerned about elevation in lipase although clinically no abd pain. on today lipase is still rising GI ordered MRI  abdomen  continue to monitor     pt counseled on etoh cessation and pt agrees  Abdominal US noted   ct scan abd noted

## 2017-09-20 NOTE — DIETITIAN INITIAL EVALUATION ADULT. - NUTRITIONGOAL OUTCOME1
Pt. diet will be advanced as condition permits recommend to advance the diet when appropriate to a low fat diet from NPO/antoinette

## 2017-09-21 LAB
ALBUMIN SERPL ELPH-MCNC: 3.1 G/DL — LOW (ref 3.3–5)
ALP SERPL-CCNC: 311 U/L — HIGH (ref 40–120)
ALT FLD-CCNC: 129 U/L — HIGH (ref 12–78)
ANION GAP SERPL CALC-SCNC: 7 MMOL/L — SIGNIFICANT CHANGE UP (ref 5–17)
AST SERPL-CCNC: 81 U/L — HIGH (ref 15–37)
BILIRUB SERPL-MCNC: 2.5 MG/DL — HIGH (ref 0.2–1.2)
BUN SERPL-MCNC: 9 MG/DL — SIGNIFICANT CHANGE UP (ref 7–23)
CALCIUM SERPL-MCNC: 8.4 MG/DL — LOW (ref 8.5–10.1)
CHLORIDE SERPL-SCNC: 107 MMOL/L — SIGNIFICANT CHANGE UP (ref 96–108)
CO2 SERPL-SCNC: 27 MMOL/L — SIGNIFICANT CHANGE UP (ref 22–31)
CREAT SERPL-MCNC: 1.23 MG/DL — SIGNIFICANT CHANGE UP (ref 0.5–1.3)
GLUCOSE SERPL-MCNC: 94 MG/DL — SIGNIFICANT CHANGE UP (ref 70–99)
IGG SERPL-MCNC: 1140 MG/DL — SIGNIFICANT CHANGE UP (ref 767–1590)
IGG1 SER-MCNC: 748 MG/DL — SIGNIFICANT CHANGE UP (ref 341–894)
IGG2 SER-MCNC: 322 MG/DL — SIGNIFICANT CHANGE UP (ref 171–632)
IGG3 SER-MCNC: 34.5 MG/DL — SIGNIFICANT CHANGE UP (ref 18.4–106)
IGG4 SER-MCNC: 20.3 MG/DL — SIGNIFICANT CHANGE UP (ref 2.4–121)
LIDOCAIN IGE QN: 1781 U/L — HIGH (ref 73–393)
MAGNESIUM SERPL-MCNC: 2.2 MG/DL — SIGNIFICANT CHANGE UP (ref 1.6–2.6)
PHOSPHATE SERPL-MCNC: 2.5 MG/DL — SIGNIFICANT CHANGE UP (ref 2.5–4.5)
POTASSIUM SERPL-MCNC: 3.8 MMOL/L — SIGNIFICANT CHANGE UP (ref 3.5–5.3)
POTASSIUM SERPL-SCNC: 3.8 MMOL/L — SIGNIFICANT CHANGE UP (ref 3.5–5.3)
PROT SERPL-MCNC: 7.1 GM/DL — SIGNIFICANT CHANGE UP (ref 6–8.3)
SODIUM SERPL-SCNC: 141 MMOL/L — SIGNIFICANT CHANGE UP (ref 135–145)

## 2017-09-21 PROCEDURE — 99232 SBSQ HOSP IP/OBS MODERATE 35: CPT

## 2017-09-21 RX ORDER — SODIUM CHLORIDE 9 MG/ML
1000 INJECTION INTRAMUSCULAR; INTRAVENOUS; SUBCUTANEOUS
Qty: 0 | Refills: 0 | Status: DISCONTINUED | OUTPATIENT
Start: 2017-09-21 | End: 2017-09-22

## 2017-09-21 RX ORDER — MORPHINE SULFATE 50 MG/1
4 CAPSULE, EXTENDED RELEASE ORAL ONCE
Qty: 0 | Refills: 0 | Status: DISCONTINUED | OUTPATIENT
Start: 2017-09-21 | End: 2017-09-21

## 2017-09-21 RX ADMIN — SODIUM CHLORIDE 100 MILLILITER(S): 9 INJECTION INTRAMUSCULAR; INTRAVENOUS; SUBCUTANEOUS at 23:31

## 2017-09-21 RX ADMIN — MORPHINE SULFATE 4 MILLIGRAM(S): 50 CAPSULE, EXTENDED RELEASE ORAL at 03:34

## 2017-09-21 RX ADMIN — SODIUM CHLORIDE 100 MILLILITER(S): 9 INJECTION INTRAMUSCULAR; INTRAVENOUS; SUBCUTANEOUS at 03:34

## 2017-09-21 RX ADMIN — MORPHINE SULFATE 2 MILLIGRAM(S): 50 CAPSULE, EXTENDED RELEASE ORAL at 00:21

## 2017-09-21 RX ADMIN — MORPHINE SULFATE 4 MILLIGRAM(S): 50 CAPSULE, EXTENDED RELEASE ORAL at 03:49

## 2017-09-21 RX ADMIN — MORPHINE SULFATE 2 MILLIGRAM(S): 50 CAPSULE, EXTENDED RELEASE ORAL at 18:54

## 2017-09-21 RX ADMIN — PANTOPRAZOLE SODIUM 40 MILLIGRAM(S): 20 TABLET, DELAYED RELEASE ORAL at 12:11

## 2017-09-21 RX ADMIN — MORPHINE SULFATE 2 MILLIGRAM(S): 50 CAPSULE, EXTENDED RELEASE ORAL at 18:39

## 2017-09-21 RX ADMIN — MORPHINE SULFATE 2 MILLIGRAM(S): 50 CAPSULE, EXTENDED RELEASE ORAL at 00:35

## 2017-09-21 NOTE — PROGRESS NOTE ADULT - PROBLEM SELECTOR PLAN 1
iv hydration  npo   lipase rising and pt now admit to pain occurring intermittently requiring morphine  gi consult noted     pt counseled on etoh cessation and pt agrees  Abdominal US noted   ct scan abd noted   mri abd notde    for ERCP once resolved  check lymphoctyes

## 2017-09-21 NOTE — PROGRESS NOTE ADULT - SUBJECTIVE AND OBJECTIVE BOX
Pt stable but concerned about rising lipase - now 1781  spoke with Dr Rajan -  pt is now assymptomatic   MRI neg; CT neg; U/S neg  need to follow LFT's (slightly elevated) and Lipase  If labs continue to rise will need ERCP    MEDICATIONS  (STANDING):  pantoprazole  Injectable 40 milliGRAM(s) IV Push daily  sodium chloride 0.9%. 1000 milliLiter(s) (100 mL/Hr) IV Continuous <Continuous>  sodium chloride 0.9%. 1000 milliLiter(s) (100 mL/Hr) IV Continuous <Continuous>    MEDICATIONS  (PRN):  morphine  - Injectable 2 milliGRAM(s) IV Push every 4 hours PRN Moderate Pain (4 - 6)      Allergies    dust (Sneezing)  No Known Drug Allergies    Intolerances        Vital Signs Last 24 Hrs  T(C): 36.7 (21 Sep 2017 11:27), Max: 37.6 (21 Sep 2017 00:18)  T(F): 98 (21 Sep 2017 11:27), Max: 99.6 (21 Sep 2017 00:18)  HR: 69 (21 Sep 2017 11:27) (63 - 84)  BP: 136/72 (21 Sep 2017 11:27) (127/69 - 141/82)  BP(mean): --  RR: 16 (21 Sep 2017 11:27) (16 - 17)  SpO2: 96% (21 Sep 2017 11:27) (96% - 99%)    PHYSICAL EXAM:  General: NAD.  CVS: S1, S2  Chest: air entry bilaterally present  Abd: BS present, soft, + tender mid abd      LABS:    09-21    141  |  107  |  9   ----------------------------<  94  3.8   |  27  |  1.23    Ca    8.4<L>      21 Sep 2017 07:29  Phos  2.5     09-21  Mg     2.2     09-21    TPro  7.1  /  Alb  3.1<L>  /  TBili  2.5<H>  /  DBili  x   /  AST  81<H>  /  ALT  129<H>  /  AlkPhos  311<H>  09-21    CT Abdomen and Pelvis w/ Oral Cont and w/ IV Cont (09.16.17 @ 02:08) >  IMPRESSION:  No CT evidence of pancreatitis.     MRI Abdomen w/wo Cont (09.20.17 @ 11:36) >  IMPRESSION:    No cholelithiasis, choledocholithiasis, or biliary ductal dilatation.  Mild pancreatitis involving pancreatic head.    : US Abdomen Complete (09.16.17 @ 11:11) >  IMPRESsion :Contracted gallbladder.  No biliary ductal dilatation.    Spoke with pt and daughter -pt has not  been truthful about abd pain  Pt has been having pain - will make NPO and follow labs

## 2017-09-21 NOTE — PROGRESS NOTE ADULT - SUBJECTIVE AND OBJECTIVE BOX
Patient is a 62y old  Male who presents with a chief complaint of abdominal pain (16 Sep 2017 04:00)      OVERNIGHT EVENTS:      REVIEW OF SYSTEMS: denies chest pain/SOB, diaphoresis, no F/C, cough, dizziness, headache, blurry vision, nausea, vomiting, abdominal pain. Rest unremarkable     MEDICATIONS  (STANDING):  pantoprazole  Injectable 40 milliGRAM(s) IV Push daily  sodium chloride 0.9%. 1000 milliLiter(s) (100 mL/Hr) IV Continuous <Continuous>    MEDICATIONS  (PRN):  morphine  - Injectable 2 milliGRAM(s) IV Push every 4 hours PRN Moderate Pain (4 - 6)      Allergies    Drug Allergies Not Recorded  dust (Sneezing)    Intolerances        SUBJECTIVE: in bed in NAD,  had excruciating abd pain overnight   and come to find out patient has not been forthcoming and honest with housestaff regarding his pain no acute events overnight     Vital Signs Last 24 Hrs  T(C): 36.7 (21 Sep 2017 11:27), Max: 37.6 (21 Sep 2017 00:18)  T(F): 98 (21 Sep 2017 11:27), Max: 99.6 (21 Sep 2017 00:18)  HR: 69 (21 Sep 2017 11:27) (63 - 84)  BP: 136/72 (21 Sep 2017 11:27) (127/69 - 141/82)  BP(mean): --  RR: 16 (21 Sep 2017 11:27) (16 - 17)  SpO2: 96% (21 Sep 2017 11:27) (96% - 99%)      PHYSICAL EXAM:  GENERAL: NAD, well-groomed, well-developed  HEAD:  Atraumatic, Normocephalic  EYES: EOMI, PERRLA, conjunctiva and sclera clear  ENMT: No tonsillar erythema, exudates, or enlargement; Moist mucous membranes, Good dentition, No lesions  NECK: Supple, No JVD, Normal thyroid  CHEST/LUNG: Clear to  auscultation bilaterally; No rales, rhonchi, wheezing, or rubs  bilaterally  HEART: Regular rate and rhythm; No murmurs, rubs, or gallops  ABDOMEN: Soft, epigastric tenedreness, Nondistended; Bowel sounds present  EXTREMITIES:  2+ Peripheral Pulses, No clubbing, cyanosis, or edema BL LE  LYMPH: No lymphadenopathy noted  SKIN: No rashes or lesions  NERVOUS SYSTEM:  Alert & Oriented X3, Good concentration; Motor Strength 5/5 B/L upper and lower extremities;   DTRs 2+ intact and symmetric, sensation intact BL    LABS:  09-21    141  |  107  |  9   ----------------------------<  94  3.8   |  27  |  1.23    Ca    8.4<L>      21 Sep 2017 07:29  Phos  2.5     09-21  Mg     2.2     09-21    TPro  7.1  /  Alb  3.1<L>  /  TBili  2.5<H>  /  DBili  x   /  AST  81<H>  /  ALT  129<H>  /  AlkPhos  311<H>  09-21    Lipase, Serum in AM (09.21.17 @ 07:29)    Lipase, Serum: 1781 U/L            Cultures;   CAPILLARY BLOOD GLUCOSE        Lipid panel:           RADIOLOGY & ADDITIONAL TESTS:    < from: US Abdomen Complete (09.16.17 @ 11:11) >  IMPRESSION:    Contracted gallbladder.  No biliary ductal dilatation.          < from: CT Abdomen and Pelvis w/ Oral Cont and w/ IV Cont (09.16.17 @ 02:08) >  IMPRESSION:    No CT evidence of pancreatitis.    < from: MRI Abdomen w/wo Cont (09.20.17 @ 11:36) >    IMPRESSION:      No cholelithiasis, choledocholithiasis, or biliary ductal dilatation.  Mild pancreatitis involving pancreatic head.          < end of copied text >                Imaging Personally Reviewed:  [ ] YES      Consultant(s) Notes Reviewed:  [ ] YES     Care Discussed with [ ] Consultants [X ] Patient [ ] Family  [x ]    [x ]  Other; RN

## 2017-09-22 LAB
ALBUMIN SERPL ELPH-MCNC: 3 G/DL — LOW (ref 3.3–5)
ALP SERPL-CCNC: 315 U/L — HIGH (ref 40–120)
ALT FLD-CCNC: 136 U/L — HIGH (ref 12–78)
ANION GAP SERPL CALC-SCNC: 10 MMOL/L — SIGNIFICANT CHANGE UP (ref 5–17)
AST SERPL-CCNC: 91 U/L — HIGH (ref 15–37)
BILIRUB DIRECT SERPL-MCNC: 0.42 MG/DL — HIGH (ref 0.05–0.2)
BILIRUB INDIRECT FLD-MCNC: 1.3 MG/DL — HIGH (ref 0.2–1)
BILIRUB SERPL-MCNC: 1.7 MG/DL — HIGH (ref 0.2–1.2)
BUN SERPL-MCNC: 8 MG/DL — SIGNIFICANT CHANGE UP (ref 7–23)
CALCIUM SERPL-MCNC: 8.6 MG/DL — SIGNIFICANT CHANGE UP (ref 8.5–10.1)
CHLORIDE SERPL-SCNC: 108 MMOL/L — SIGNIFICANT CHANGE UP (ref 96–108)
CO2 SERPL-SCNC: 23 MMOL/L — SIGNIFICANT CHANGE UP (ref 22–31)
CREAT SERPL-MCNC: 1.07 MG/DL — SIGNIFICANT CHANGE UP (ref 0.5–1.3)
GLUCOSE SERPL-MCNC: 86 MG/DL — SIGNIFICANT CHANGE UP (ref 70–99)
HCT VFR BLD CALC: 44.8 % — SIGNIFICANT CHANGE UP (ref 39–50)
HGB BLD-MCNC: 14.2 G/DL — SIGNIFICANT CHANGE UP (ref 13–17)
LIDOCAIN IGE QN: 2064 U/L — HIGH (ref 73–393)
MCHC RBC-ENTMCNC: 31.7 PG — SIGNIFICANT CHANGE UP (ref 27–34)
MCHC RBC-ENTMCNC: 31.8 GM/DL — LOW (ref 32–36)
MCV RBC AUTO: 99.7 FL — SIGNIFICANT CHANGE UP (ref 80–100)
PLATELET # BLD AUTO: 214 K/UL — SIGNIFICANT CHANGE UP (ref 150–400)
POTASSIUM SERPL-MCNC: 3.7 MMOL/L — SIGNIFICANT CHANGE UP (ref 3.5–5.3)
POTASSIUM SERPL-SCNC: 3.7 MMOL/L — SIGNIFICANT CHANGE UP (ref 3.5–5.3)
PROT SERPL-MCNC: 7 GM/DL — SIGNIFICANT CHANGE UP (ref 6–8.3)
RBC # BLD: 4.49 M/UL — SIGNIFICANT CHANGE UP (ref 4.2–5.8)
RBC # FLD: 11.5 % — SIGNIFICANT CHANGE UP (ref 11–15)
SODIUM SERPL-SCNC: 141 MMOL/L — SIGNIFICANT CHANGE UP (ref 135–145)
WBC # BLD: 6.3 K/UL — SIGNIFICANT CHANGE UP (ref 3.8–10.5)
WBC # FLD AUTO: 6.3 K/UL — SIGNIFICANT CHANGE UP (ref 3.8–10.5)

## 2017-09-22 PROCEDURE — 99231 SBSQ HOSP IP/OBS SF/LOW 25: CPT

## 2017-09-22 RX ORDER — ELECTROLYTE SOLUTION,INJ
1 VIAL (ML) INTRAVENOUS
Qty: 0 | Refills: 0 | Status: DISCONTINUED | OUTPATIENT
Start: 2017-09-22 | End: 2017-09-22

## 2017-09-22 RX ORDER — SODIUM CHLORIDE 9 MG/ML
1000 INJECTION INTRAMUSCULAR; INTRAVENOUS; SUBCUTANEOUS
Qty: 0 | Refills: 0 | Status: DISCONTINUED | OUTPATIENT
Start: 2017-09-22 | End: 2017-09-26

## 2017-09-22 RX ORDER — SODIUM CHLORIDE 9 MG/ML
1000 INJECTION INTRAMUSCULAR; INTRAVENOUS; SUBCUTANEOUS
Qty: 0 | Refills: 0 | Status: DISCONTINUED | OUTPATIENT
Start: 2017-09-22 | End: 2017-09-22

## 2017-09-22 RX ADMIN — SODIUM CHLORIDE 100 MILLILITER(S): 9 INJECTION INTRAMUSCULAR; INTRAVENOUS; SUBCUTANEOUS at 16:20

## 2017-09-22 RX ADMIN — Medication 1 EACH: at 22:01

## 2017-09-22 RX ADMIN — PANTOPRAZOLE SODIUM 40 MILLIGRAM(S): 20 TABLET, DELAYED RELEASE ORAL at 13:27

## 2017-09-22 NOTE — PROGRESS NOTE ADULT - SUBJECTIVE AND OBJECTIVE BOX
Patient is a 62y old  Male who presents with a chief complaint of abdominal pain (16 Sep 2017 04:00)      OVERNIGHT EVENTS:      REVIEW OF SYSTEMS: denies chest pain/SOB, diaphoresis, no F/C, cough, dizziness, headache, blurry vision, nausea, vomiting, abdominal pain. Rest unremarkable     MEDICATIONS  (STANDING):  pantoprazole  Injectable 40 milliGRAM(s) IV Push daily  sodium chloride 0.9%. 1000 milliLiter(s) (100 mL/Hr) IV Continuous <Continuous>    MEDICATIONS  (PRN):  morphine  - Injectable 2 milliGRAM(s) IV Push every 4 hours PRN Moderate Pain (4 - 6)      Allergies    Drug Allergies Not Recorded  dust (Sneezing)    Intolerances        SUBJECTIVE: in bed in NAD,  had excruciating abd pain overnight   and come to find out patient has not been forthcoming and honest with housestaff regarding his pain no acute events overnight     Vital Signs Last 24 Hrs  T(C): 36.7 (21 Sep 2017 11:27), Max: 37.6 (21 Sep 2017 00:18)  T(F): 98 (21 Sep 2017 11:27), Max: 99.6 (21 Sep 2017 00:18)  HR: 69 (21 Sep 2017 11:27) (63 - 84)  BP: 136/72 (21 Sep 2017 11:27) (127/69 - 141/82)  BP(mean): --  RR: 16 (21 Sep 2017 11:27) (16 - 17)  SpO2: 96% (21 Sep 2017 11:27) (96% - 99%)      PHYSICAL EXAM:  GENERAL: NAD, well-groomed, well-developed  HEAD:  Atraumatic, Normocephalic  EYES: EOMI, PERRLA, conjunctiva and sclera clear  ENMT: No tonsillar erythema, exudates, or enlargement; Moist mucous membranes, Good dentition, No lesions  NECK: Supple, No JVD, Normal thyroid  CHEST/LUNG: Clear to  auscultation bilaterally; No rales, rhonchi, wheezing, or rubs  bilaterally  HEART: Regular rate and rhythm; No murmurs, rubs, or gallops  ABDOMEN: Soft, epigastric tenedreness, Nondistended; Bowel sounds present  EXTREMITIES:  2+ Peripheral Pulses, No clubbing, cyanosis, or edema BL LE  LYMPH: No lymphadenopathy noted  SKIN: No rashes or lesions  NERVOUS SYSTEM:  Alert & Oriented X3, Good concentration; Motor Strength 5/5 B/L upper and lower extremities;   DTRs 2+ intact and symmetric, sensation intact BL    LABS:                        14.2   6.3   )-----------( 214      ( 22 Sep 2017 06:52 )             44.8   09-22    141  |  108  |  8   ----------------------------<  86  3.7   |  23  |  1.07    Ca    8.6      22 Sep 2017 06:52  Phos  2.5     09-21  Mg     2.2     09-21    TPro  7.0  /  Alb  3.0<L>  /  TBili  1.7<H>  /  DBili  .42<H>  /  AST  91<H>  /  ALT  136<H>  /  AlkPhos  315<H>  09-22        Cultures;   CAPILLARY BLOOD GLUCOSE        Lipid panel:           RADIOLOGY & ADDITIONAL TESTS:    < from: US Abdomen Complete (09.16.17 @ 11:11) >  IMPRESSION:    Contracted gallbladder.  No biliary ductal dilatation.          < from: CT Abdomen and Pelvis w/ Oral Cont and w/ IV Cont (09.16.17 @ 02:08) >  IMPRESSION:    No CT evidence of pancreatitis.    < from: MRI Abdomen w/wo Cont (09.20.17 @ 11:36) >    IMPRESSION:      No cholelithiasis, choledocholithiasis, or biliary ductal dilatation.  Mild pancreatitis involving pancreatic head.          < end of copied text >                Imaging Personally Reviewed:  [ ] YES      Consultant(s) Notes Reviewed:  [ ] YES     Care Discussed with [ ] Consultants [X ] Patient [ ] Family  [x ]    [x ]  Other; RN Patient is a 62y old  Male who presents with a chief complaint of abdominal pain (16 Sep 2017 04:00)      OVERNIGHT EVENTS:      REVIEW OF SYSTEMS: denies chest pain/SOB, diaphoresis, no F/C, cough, dizziness, headache, blurry vision, nausea, vomiting, abdominal pain. Rest unremarkable     MEDICATIONS  (STANDING):  pantoprazole  Injectable 40 milliGRAM(s) IV Push daily  sodium chloride 0.9%. 1000 milliLiter(s) (100 mL/Hr) IV Continuous <Continuous>    MEDICATIONS  (PRN):  morphine  - Injectable 2 milliGRAM(s) IV Push every 4 hours PRN Moderate Pain (4 - 6)      Allergies    Drug Allergies Not Recorded  dust (Sneezing)    Intolerances        SUBJECTIVE: in bed in NAD,  had excruciating abd pain overnight   and come to find out patient has not been forthcoming and honest with housestaff regarding his pain no acute events overnight     Vital Signs Last 24 Hrs  T(C): 36.7 (21 Sep 2017 11:27), Max: 37.6 (21 Sep 2017 00:18)  T(F): 98 (21 Sep 2017 11:27), Max: 99.6 (21 Sep 2017 00:18)  HR: 69 (21 Sep 2017 11:27) (63 - 84)  BP: 136/72 (21 Sep 2017 11:27) (127/69 - 141/82)  BP(mean): --  RR: 16 (21 Sep 2017 11:27) (16 - 17)  SpO2: 96% (21 Sep 2017 11:27) (96% - 99%)      PHYSICAL EXAM:  GENERAL: NAD, well-groomed, well-developed  HEAD:  Atraumatic, Normocephalic  EYES: EOMI, PERRLA, conjunctiva and sclera clear  ENMT: No tonsillar erythema, exudates, or enlargement; Moist mucous membranes, Good dentition, No lesions  NECK: Supple, No JVD, Normal thyroid  CHEST/LUNG: Clear to  auscultation bilaterally; No rales, rhonchi, wheezing, or rubs  bilaterally  HEART: Regular rate and rhythm; No murmurs, rubs, or gallops  ABDOMEN: Soft, epigastric tenedreness, Nondistended; Bowel sounds present  EXTREMITIES:  2+ Peripheral Pulses, No clubbing, cyanosis, or edema BL LE  LYMPH: No lymphadenopathy noted  SKIN: No rashes or lesions  NERVOUS SYSTEM:  Alert & Oriented X3, Good concentration; Motor Strength 5/5 B/L upper and lower extremities;   DTRs 2+ intact and symmetric, sensation intact BL    LABS:                        14.2   6.3   )-----------( 214      ( 22 Sep 2017 06:52 )             44.8   09-22    141  |  108  |  8   ----------------------------<  86  3.7   |  23  |  1.07    Ca    8.6      22 Sep 2017 06:52  Phos  2.5     09-21  Mg     2.2     09-21    TPro  7.0  /  Alb  3.0<L>  /  TBili  1.7<H>  /  DBili  .42<H>  /  AST  91<H>  /  ALT  136<H>  /  AlkPhos  315<H>  09-22    Lipase, Serum in AM (09.22.17 @ 06:52)    Lipase, Serum: 2064 U/L        Cultures;   CAPILLARY BLOOD GLUCOSE        Lipid panel:           RADIOLOGY & ADDITIONAL TESTS:    < from: US Abdomen Complete (09.16.17 @ 11:11) >  IMPRESSION:    Contracted gallbladder.  No biliary ductal dilatation.          < from: CT Abdomen and Pelvis w/ Oral Cont and w/ IV Cont (09.16.17 @ 02:08) >  IMPRESSION:    No CT evidence of pancreatitis.    < from: MRI Abdomen w/wo Cont (09.20.17 @ 11:36) >    IMPRESSION:      No cholelithiasis, choledocholithiasis, or biliary ductal dilatation.  Mild pancreatitis involving pancreatic head.          < end of copied text >                Imaging Personally Reviewed:  [ ] YES      Consultant(s) Notes Reviewed:  [ ] YES     Care Discussed with [ ] Consultants [X ] Patient [ ] Family  [x ]    [x ]  Other; RN Patient is a 62y old  Male who presents with a chief complaint of abdominal pain (16 Sep 2017 04:00)      OVERNIGHT EVENTS:      REVIEW OF SYSTEMS: denies chest pain/SOB, diaphoresis, no F/C, cough, dizziness, headache, blurry vision, nausea, vomiting, abdominal pain. Rest unremarkable     MEDICATIONS  (STANDING):  pantoprazole  Injectable 40 milliGRAM(s) IV Push daily  sodium chloride 0.9%. 1000 milliLiter(s) (100 mL/Hr) IV Continuous <Continuous>    MEDICATIONS  (PRN):  morphine  - Injectable 2 milliGRAM(s) IV Push every 4 hours PRN Moderate Pain (4 - 6)      Allergies    Drug Allergies Not Recorded  dust (Sneezing)    Intolerances        SUBJECTIVE: in bed in NAD, stated no pain since last night     Vital Signs Last 24 Hrs  T(C): 36.7 (21 Sep 2017 11:27), Max: 37.6 (21 Sep 2017 00:18)  T(F): 98 (21 Sep 2017 11:27), Max: 99.6 (21 Sep 2017 00:18)  HR: 69 (21 Sep 2017 11:27) (63 - 84)  BP: 136/72 (21 Sep 2017 11:27) (127/69 - 141/82)  BP(mean): --  RR: 16 (21 Sep 2017 11:27) (16 - 17)  SpO2: 96% (21 Sep 2017 11:27) (96% - 99%)      PHYSICAL EXAM:  GENERAL: NAD, well-groomed, well-developed  HEAD:  Atraumatic, Normocephalic  EYES: EOMI, PERRLA, conjunctiva and sclera clear  ENMT: No tonsillar erythema, exudates, or enlargement; Moist mucous membranes, Good dentition, No lesions  NECK: Supple, No JVD, Normal thyroid  CHEST/LUNG: Clear to  auscultation bilaterally; No rales, rhonchi, wheezing, or rubs  bilaterally  HEART: Regular rate and rhythm; No murmurs, rubs, or gallops  ABDOMEN: Soft, mild epigastric tenderness Nondistended; Bowel sounds present  EXTREMITIES:  2+ Peripheral Pulses, No clubbing, cyanosis, or edema BL LE  LYMPH: No lymphadenopathy noted  SKIN: No rashes or lesions  NERVOUS SYSTEM:  Alert & Oriented X3, Good concentration; Motor Strength 5/5 B/L upper and lower extremities;   DTRs 2+ intact and symmetric, sensation intact BL    LABS:                        14.2   6.3   )-----------( 214      ( 22 Sep 2017 06:52 )             44.8   09-22    141  |  108  |  8   ----------------------------<  86  3.7   |  23  |  1.07    Ca    8.6      22 Sep 2017 06:52  Phos  2.5     09-21  Mg     2.2     09-21    TPro  7.0  /  Alb  3.0<L>  /  TBili  1.7<H>  /  DBili  .42<H>  /  AST  91<H>  /  ALT  136<H>  /  AlkPhos  315<H>  09-22    Lipase, Serum in AM (09.22.17 @ 06:52)    Lipase, Serum: 2064 U/L        Cultures;   CAPILLARY BLOOD GLUCOSE        Lipid panel:           RADIOLOGY & ADDITIONAL TESTS:    < from: US Abdomen Complete (09.16.17 @ 11:11) >  IMPRESSION:    Contracted gallbladder.  No biliary ductal dilatation.          < from: CT Abdomen and Pelvis w/ Oral Cont and w/ IV Cont (09.16.17 @ 02:08) >  IMPRESSION:    No CT evidence of pancreatitis.    < from: MRI Abdomen w/wo Cont (09.20.17 @ 11:36) >    IMPRESSION:      No cholelithiasis, choledocholithiasis, or biliary ductal dilatation.  Mild pancreatitis involving pancreatic head.          < end of copied text >                Imaging Personally Reviewed:  [ ] YES      Consultant(s) Notes Reviewed:  [ ] YES     Care Discussed with [ ] Consultants [X ] Patient [ ] Family  [x ]    [x ]  Other; RN

## 2017-09-22 NOTE — PROGRESS NOTE ADULT - PROBLEM SELECTOR PLAN 1
iv hydration  npo   lipase rising and pt now admit to pain occurring intermittently requiring morphine  gi consult noted     pt counseled on etoh cessation and pt agrees  Abdominal US noted   ct scan abd noted   mri abd notde    for ERCP once resolved  check lymphoctyes iv hydration  npo   lipase rising and pt now admit to pain occurring intermittently requiring morphine  gi consult noted     pt counseled on etoh cessation and pt agrees  Abdominal US noted   ct scan abd noted   mri abd noted    for ERCP once resolved

## 2017-09-22 NOTE — PROGRESS NOTE ADULT - SUBJECTIVE AND OBJECTIVE BOX
Pt had pain last night but none today  Lipase rising - now NPO  PPN started      MEDICATIONS  (STANDING):  pantoprazole  Injectable 40 milliGRAM(s) IV Push daily  Parenteral Nutrition - Adult 1 Each (84 mL/Hr) TPN Continuous <Continuous>  sodium chloride 0.9%. 1000 milliLiter(s) (100 mL/Hr) IV Continuous <Continuous>    MEDICATIONS  (PRN):  morphine  - Injectable 2 milliGRAM(s) IV Push every 4 hours PRN Moderate Pain (4 - 6)      Allergies    dust (Sneezing)  No Known Drug Allergies    Intolerances        Vital Signs Last 24 Hrs  T(C): 37.1 (22 Sep 2017 17:00), Max: 37.1 (22 Sep 2017 17:00)  T(F): 98.8 (22 Sep 2017 17:00), Max: 98.8 (22 Sep 2017 17:00)  HR: 63 (22 Sep 2017 17:00) (63 - 71)  BP: 124/76 (22 Sep 2017 17:00) (124/76 - 137/87)  BP(mean): --  RR: 18 (22 Sep 2017 17:00) (16 - 18)  SpO2: 96% (22 Sep 2017 17:00) (95% - 97%)    PHYSICAL EXAM:  General: NAD.  CVS: S1, S2  Chest: air entry bilaterally present  Abd: BS present, soft, non-tender      LABS:                        14.2   6.3   )-----------( 214      ( 22 Sep 2017 06:52 )             44.8     09-22    141  |  108  |  8   ----------------------------<  86  3.7   |  23  |  1.07    Ca    8.6      22 Sep 2017 06:52  Phos  2.5     09-21  Mg     2.2     09-21    TPro  7.0  /  Alb  3.0<L>  /  TBili  1.7<H>  /  DBili  .42<H>  /  AST  91<H>  /  ALT  136<H>  /  AlkPhos  315<H>  09-22      Lipase in AM   Labs in AM  PPN  NPO

## 2017-09-23 LAB
ALBUMIN SERPL ELPH-MCNC: 3.1 G/DL — LOW (ref 3.3–5)
ALP SERPL-CCNC: 338 U/L — HIGH (ref 40–120)
ALT FLD-CCNC: 143 U/L — HIGH (ref 12–78)
ANION GAP SERPL CALC-SCNC: 10 MMOL/L — SIGNIFICANT CHANGE UP (ref 5–17)
AST SERPL-CCNC: 93 U/L — HIGH (ref 15–37)
BILIRUB SERPL-MCNC: 1.9 MG/DL — HIGH (ref 0.2–1.2)
BUN SERPL-MCNC: 10 MG/DL — SIGNIFICANT CHANGE UP (ref 7–23)
CALCIUM SERPL-MCNC: 9 MG/DL — SIGNIFICANT CHANGE UP (ref 8.5–10.1)
CANCER AG19-9 SERPL-ACNC: 38.1 U/ML — SIGNIFICANT CHANGE UP
CEA SERPL-MCNC: 1.2 NG/ML — SIGNIFICANT CHANGE UP (ref 0–3.8)
CHLORIDE SERPL-SCNC: 106 MMOL/L — SIGNIFICANT CHANGE UP (ref 96–108)
CO2 SERPL-SCNC: 25 MMOL/L — SIGNIFICANT CHANGE UP (ref 22–31)
CREAT SERPL-MCNC: 1.04 MG/DL — SIGNIFICANT CHANGE UP (ref 0.5–1.3)
GLUCOSE SERPL-MCNC: 95 MG/DL — SIGNIFICANT CHANGE UP (ref 70–99)
LIDOCAIN IGE QN: 2073 U/L — HIGH (ref 73–393)
MAGNESIUM SERPL-MCNC: 2.2 MG/DL — SIGNIFICANT CHANGE UP (ref 1.6–2.6)
PHOSPHATE SERPL-MCNC: 3.1 MG/DL — SIGNIFICANT CHANGE UP (ref 2.5–4.5)
POTASSIUM SERPL-MCNC: 3.8 MMOL/L — SIGNIFICANT CHANGE UP (ref 3.5–5.3)
POTASSIUM SERPL-SCNC: 3.8 MMOL/L — SIGNIFICANT CHANGE UP (ref 3.5–5.3)
PROT SERPL-MCNC: 7.1 GM/DL — SIGNIFICANT CHANGE UP (ref 6–8.3)
SODIUM SERPL-SCNC: 141 MMOL/L — SIGNIFICANT CHANGE UP (ref 135–145)

## 2017-09-23 PROCEDURE — 99231 SBSQ HOSP IP/OBS SF/LOW 25: CPT

## 2017-09-23 RX ORDER — ELECTROLYTE SOLUTION,INJ
1 VIAL (ML) INTRAVENOUS
Qty: 0 | Refills: 0 | Status: DISCONTINUED | OUTPATIENT
Start: 2017-09-23 | End: 2017-09-23

## 2017-09-23 RX ADMIN — PANTOPRAZOLE SODIUM 40 MILLIGRAM(S): 20 TABLET, DELAYED RELEASE ORAL at 11:29

## 2017-09-23 RX ADMIN — Medication 1 EACH: at 22:28

## 2017-09-23 NOTE — PROGRESS NOTE ADULT - PROBLEM SELECTOR PLAN 1
iv hydration  npo   lipase rising but a very mild rise from yesterday to today currently patient on PPN. will continue to monitor..   continue with PRN pain meds  gi consult noted     pt counseled on etoh cessation and pt agrees  Abdominal US noted   ct scan abd noted   mri abd noted    for ERCP once resolved no pain iv hydration  npo   lipase rising but a very mild rise from yesterday to today currently patient on PPN. will continue to monitor..   continue with PRN pain meds  gi consult noted     pt counseled on etoh cessation and pt agrees  Abdominal US noted   ct scan abd noted   mri abd noted    for ERCP once resolved

## 2017-09-23 NOTE — PROGRESS NOTE ADULT - SUBJECTIVE AND OBJECTIVE BOX
Patient is a 62y old  Male who presents with a chief complaint of abdominal pain (16 Sep 2017 04:00)      HPI:  Pt. is a 63 y/o w/o significant pmhx except mild gerd, was in usoh till 2 days ago started to develop r-sided and epigastric pain exacerbated by food.  sx's were getting worse, pt denies such sx's in past.  Does report hx of gerd but those sx's very different and usually very mild states now was also getting pain radiating to r back at times which made him seek care.  In ed has lipase of 1015.   Pt denies  any fever, chills, sob, cp palpitations n/v/d/c. no sick contacts or travels. pt does report etoh ~2 beers qd (32oz) states occasionally more, but never drunk. (16 Sep 2017 04:00)      INTERVAL HPI/OVERNIGHT EVENTS:  The patient denies melena, hematochezia, hematemesis, nausea, vomiting, abdominal pain, constipation, diarrhea, or change in bowel movements     MEDICATIONS  (STANDING):  pantoprazole  Injectable 40 milliGRAM(s) IV Push daily  Parenteral Nutrition - Adult 1 Each (84 mL/Hr) TPN Continuous <Continuous>  sodium chloride 0.9%. 1000 milliLiter(s) (100 mL/Hr) IV Continuous <Continuous>  Parenteral Nutrition - Adult 1 Each (84 mL/Hr) TPN Continuous <Continuous>    MEDICATIONS  (PRN):  morphine  - Injectable 2 milliGRAM(s) IV Push every 4 hours PRN Moderate Pain (4 - 6)      FAMILY HISTORY:  No pertinent family history in first degree relatives      Allergies    dust (Sneezing)  No Known Drug Allergies    Intolerances        PMH/PSH:  Pneumothorax on right  GERD (gastroesophageal reflux disease)  H/O chest tube placement  History of hernia repair        REVIEW OF SYSTEMS:  CONSTITUTIONAL: No fever, weight loss, or fatigue  EYES: No eye pain, visual disturbances, or discharge  ENMT:  No difficulty hearing, tinnitus, vertigo; No sinus or throat pain  NECK: No pain or stiffness  BREASTS: No pain, masses, or nipple discharge  RESPIRATORY: No cough, wheezing, chills or hemoptysis; No shortness of breath  CARDIOVASCULAR: No chest pain, palpitations, dizziness, or leg swelling  GASTROINTESTINAL:  See above  GENITOURINARY: No dysuria, frequency, hematuria, or incontinence  NEUROLOGICAL: No headaches, memory loss, loss of strength, numbness, or tremors  SKIN: No itching, burning, rashes, or lesions   LYMPH NODES: No enlarged glands  ENDOCRINE: No heat or cold intolerance; No hair loss  MUSCULOSKELETAL: No joint pain or swelling; No muscle, back, or extremity pain  PSYCHIATRIC: No depression, anxiety, mood swings, or difficulty sleeping  HEME/LYMPH: No easy bruising, or bleeding gums  ALLERGY AND IMMUNOLOGIC: No hives or eczema    Vital Signs Last 24 Hrs  T(C): 37.1 (23 Sep 2017 17:44), Max: 37.1 (23 Sep 2017 17:44)  T(F): 98.8 (23 Sep 2017 17:44), Max: 98.8 (23 Sep 2017 17:44)  HR: 65 (23 Sep 2017 17:44) (61 - 70)  BP: 128/84 (23 Sep 2017 17:44) (115/64 - 136/80)  BP(mean): --  RR: 16 (23 Sep 2017 17:44) (16 - 18)  SpO2: 96% (23 Sep 2017 17:44) (96% - 98%)    PHYSICAL EXAM:  GENERAL: NAD, well-groomed, well-developed  HEAD:  Atraumatic, Normocephalic  EYES: EOMI, PERRLA, conjunctiva and sclera clear  NECK: Supple, No JVD, Normal thyroid  NERVOUS SYSTEM:  Alert & Oriented X3, Good concentration; Motor Strength 5/5 B/L upper and lower extremities;   CHEST/LUNG: Clear to percussion bilaterally; No rales, rhonchi, wheezing, or rubs  HEART: Regular rate and rhythm; No murmurs, rubs, or gallops  ABDOMEN: Soft, Nontender, Nondistended; Bowel sounds present  EXTREMITIES:  2+ Peripheral Pulses, No clubbing, cyanosis, or edema  LYMPH: No lymphadenopathy noted  SKIN: No rashes or lesions    LAB  09-23 @ 07:29  amylase --   lipase 2073 09-22 @ 06:52  amylase --   lipase 2064 09-21 @ 07:29  amylase --   lipase 1781 09-20 @ 06:58  amylase --   lipase 1403   09-19 @ 06:27  amylase --   lipase 1278                           14.2   6.3   )-----------( 214      ( 22 Sep 2017 06:52 )             44.8       CBC:  09-22 @ 06:52  WBC 6.3   Hgb 14.2   Hct 44.8   Plts 214  MCV 99.7      Chemistry:  09-23 @ 07:29  Na+ 141  K+ 3.8  Cl- 106  CO2 25  BUN 10  Cr 1.04     09-22 @ 06:52  Na+ 141  K+ 3.7  Cl- 108  CO2 23  BUN 8  Cr 1.07     09-21 @ 07:29  Na+ 141  K+ 3.8  Cl- 107  CO2 27  BUN 9  Cr 1.23     09-20 @ 06:58  Na+ 141  K+ 3.9  Cl- 108  CO2 24  BUN 8  Cr 1.26     09-19 @ 06:27  Na+ 141  K+ 3.7  Cl- 106  CO2 26  BUN 6  Cr 1.05         Glucose, Serum: 95 mg/dL (09-23 @ 07:29)  Glucose, Serum: 86 mg/dL (09-22 @ 06:52)  Glucose, Serum: 94 mg/dL (09-21 @ 07:29)  Glucose, Serum: 75 mg/dL (09-20 @ 06:58)  Glucose, Serum: 91 mg/dL (09-19 @ 06:27)      23 Sep 2017 07:29    141    |  106    |  10     ----------------------------<  95     3.8     |  25     |  1.04   22 Sep 2017 06:52    141    |  108    |  8      ----------------------------<  86     3.7     |  23     |  1.07   21 Sep 2017 07:29    141    |  107    |  9      ----------------------------<  94     3.8     |  27     |  1.23   20 Sep 2017 06:58    141    |  108    |  8      ----------------------------<  75     3.9     |  24     |  1.26   19 Sep 2017 06:27    141    |  106    |  6      ----------------------------<  91     3.7     |  26     |  1.05     Ca    9.0        23 Sep 2017 07:29  Ca    8.6        22 Sep 2017 06:52  Ca    8.4        21 Sep 2017 07:29  Ca    8.7        20 Sep 2017 06:58  Ca    8.9        19 Sep 2017 06:27  Phos  3.1       23 Sep 2017 07:29  Phos  2.5       21 Sep 2017 07:29  Phos  2.8       20 Sep 2017 06:58  Mg     2.2       23 Sep 2017 07:29  Mg     2.2       21 Sep 2017 07:29  Mg     2.3       20 Sep 2017 06:58    TPro  7.1    /  Alb  3.1    /  TBili  1.9    /  DBili  x      /  AST  93     /  ALT  143    /  AlkPhos  338    23 Sep 2017 07:29  TPro  7.0    /  Alb  3.0    /  TBili  1.7    /  DBili  .42    /  AST  91     /  ALT  136    /  AlkPhos  315    22 Sep 2017 06:52  TPro  7.1    /  Alb  3.1    /  TBili  2.5    /  DBili  x      /  AST  81     /  ALT  129    /  AlkPhos  311    21 Sep 2017 07:29  TPro  7.2    /  Alb  3.1    /  TBili  2.3    /  DBili  x      /  AST  79     /  ALT  137    /  AlkPhos  286    20 Sep 2017 06:58              CAPILLARY BLOOD GLUCOSE              RADIOLOGY & ADDITIONAL TESTS:    Imaging Personally Reviewed:  [ ] YES  [ ] NO    Consultant(s) Notes Reviewed:  [ ] YES  [ ] NO    Care Discussed with Consultants/Other Providers [ ] YES  [ ] NO

## 2017-09-23 NOTE — PROGRESS NOTE ADULT - SUBJECTIVE AND OBJECTIVE BOX
Patient is a 62y old  Male who presents with a chief complaint of abdominal pain (16 Sep 2017 04:00)      OVERNIGHT EVENTS:      REVIEW OF SYSTEMS: denies chest pain/SOB, diaphoresis, no F/C, cough, dizziness, headache, blurry vision, nausea, vomiting, abdominal pain. Rest unremarkable     MEDICATIONS  (STANDING):  pantoprazole  Injectable 40 milliGRAM(s) IV Push daily  sodium chloride 0.9%. 1000 milliLiter(s) (100 mL/Hr) IV Continuous <Continuous>    MEDICATIONS  (PRN):  morphine  - Injectable 2 milliGRAM(s) IV Push every 4 hours PRN Moderate Pain (4 - 6)      Allergies    Drug Allergies Not Recorded  dust (Sneezing)    Intolerances        SUBJECTIVE: in bed in NAD, stated no pain since last night     Vital Signs Last 24 Hrs  T(C): 36.2 (23 Sep 2017 04:50), Max: 37.1 (22 Sep 2017 17:00)  T(F): 97.2 (23 Sep 2017 04:50), Max: 98.8 (22 Sep 2017 17:00)  HR: 61 (23 Sep 2017 04:50) (61 - 71)  BP: 115/64 (23 Sep 2017 04:50) (115/64 - 136/80)  BP(mean): --  RR: 18 (23 Sep 2017 04:50) (16 - 18)  SpO2: 97% (23 Sep 2017 04:50) (95% - 98%)    PHYSICAL EXAM:  GENERAL: NAD, well-groomed, well-developed  HEAD:  Atraumatic, Normocephalic  EYES: EOMI, PERRLA, conjunctiva and sclera clear  ENMT: No tonsillar erythema, exudates, or enlargement; Moist mucous membranes, Good dentition, No lesions  NECK: Supple, No JVD, Normal thyroid  CHEST/LUNG: Clear to  auscultation bilaterally; No rales, rhonchi, wheezing, or rubs  bilaterally  HEART: Regular rate and rhythm; No murmurs, rubs, or gallops  ABDOMEN: Soft, mild epigastric tenderness Nondistended; Bowel sounds present  EXTREMITIES:  2+ Peripheral Pulses, No clubbing, cyanosis, or edema BL LE  LYMPH: No lymphadenopathy noted  SKIN: No rashes or lesions  NERVOUS SYSTEM:  Alert & Oriented X3, Good concentration; Motor Strength 5/5 B/L upper and lower extremities;   DTRs 2+ intact and symmetric, sensation intact BL    Lipase, Serum in AM (09.23.17 @ 07:29)    Lipase, Serum: 2073 U/L    09-23    141  |  106  |  10  ----------------------------<  95  3.8   |  25  |  1.04    Ca    9.0      23 Sep 2017 07:29  Phos  3.1     09-23  Mg     2.2     09-23    TPro  7.1  /  Alb  3.1<L>  /  TBili  1.9<H>  /  DBili  x   /  AST  93<H>  /  ALT  143<H>  /  AlkPhos  338<H>  09-23        Cultures;   CAPILLARY BLOOD GLUCOSE        Lipid panel:           RADIOLOGY & ADDITIONAL TESTS:    < from: US Abdomen Complete (09.16.17 @ 11:11) >  IMPRESSION:    Contracted gallbladder.  No biliary ductal dilatation.          < from: CT Abdomen and Pelvis w/ Oral Cont and w/ IV Cont (09.16.17 @ 02:08) >  IMPRESSION:    No CT evidence of pancreatitis.    < from: MRI Abdomen w/wo Cont (09.20.17 @ 11:36) >    IMPRESSION:      No cholelithiasis, choledocholithiasis, or biliary ductal dilatation.  Mild pancreatitis involving pancreatic head.          < end of copied text >                Imaging Personally Reviewed:  [ ] YES      Consultant(s) Notes Reviewed:  [ ] YES     Care Discussed with [ ] Consultants [X ] Patient [ ] Family  [x ]    [x ]  Other; RN Patient is a 62y old  Male who presents with a chief complaint of abdominal pain (16 Sep 2017 04:00)      OVERNIGHT EVENTS:      REVIEW OF SYSTEMS: denies chest pain/SOB, diaphoresis, no F/C, cough, dizziness, headache, blurry vision, nausea, vomiting, abdominal pain. Rest unremarkable     MEDICATIONS  (STANDING):  pantoprazole  Injectable 40 milliGRAM(s) IV Push daily  sodium chloride 0.9%. 1000 milliLiter(s) (100 mL/Hr) IV Continuous <Continuous>    MEDICATIONS  (PRN):  morphine  - Injectable 2 milliGRAM(s) IV Push every 4 hours PRN Moderate Pain (4 - 6)      Allergies    Drug Allergies Not Recorded  dust (Sneezing)    Intolerances        SUBJECTIVE: in bed in NAD, stated no pain since last night     Vital Signs Last 24 Hrs  T(C): 36.2 (23 Sep 2017 04:50), Max: 37.1 (22 Sep 2017 17:00)  T(F): 97.2 (23 Sep 2017 04:50), Max: 98.8 (22 Sep 2017 17:00)  HR: 61 (23 Sep 2017 04:50) (61 - 71)  BP: 115/64 (23 Sep 2017 04:50) (115/64 - 136/80)  BP(mean): --  RR: 18 (23 Sep 2017 04:50) (16 - 18)  SpO2: 97% (23 Sep 2017 04:50) (95% - 98%)    PHYSICAL EXAM:  GENERAL: NAD, well-groomed, well-developed  HEAD:  Atraumatic, Normocephalic  EYES: EOMI, PERRLA, conjunctiva and sclera clear  ENMT: No tonsillar erythema, exudates, or enlargement; Moist mucous membranes, Good dentition, No lesions  NECK: Supple, No JVD, Normal thyroid  CHEST/LUNG: Clear to  auscultation bilaterally; No rales, rhonchi, wheezing, or rubs  bilaterally  HEART: Regular rate and rhythm; No murmurs, rubs, or gallops  ABDOMEN: Soft, non tender  Nondistended; Bowel sounds present  EXTREMITIES:  2+ Peripheral Pulses, No clubbing, cyanosis, or edema BL LE  LYMPH: No lymphadenopathy noted  SKIN: No rashes or lesions  NERVOUS SYSTEM:  Alert & Oriented X3, Good concentration; Motor Strength 5/5 B/L upper and lower extremities;   DTRs 2+ intact and symmetric, sensation intact BL    Lipase, Serum in AM (09.23.17 @ 07:29)    Lipase, Serum: 2073 U/L    09-23    141  |  106  |  10  ----------------------------<  95  3.8   |  25  |  1.04    Ca    9.0      23 Sep 2017 07:29  Phos  3.1     09-23  Mg     2.2     09-23    TPro  7.1  /  Alb  3.1<L>  /  TBili  1.9<H>  /  DBili  x   /  AST  93<H>  /  ALT  143<H>  /  AlkPhos  338<H>  09-23        Cultures;   CAPILLARY BLOOD GLUCOSE        Lipid panel:           RADIOLOGY & ADDITIONAL TESTS:    < from: US Abdomen Complete (09.16.17 @ 11:11) >  IMPRESSION:    Contracted gallbladder.  No biliary ductal dilatation.          < from: CT Abdomen and Pelvis w/ Oral Cont and w/ IV Cont (09.16.17 @ 02:08) >  IMPRESSION:    No CT evidence of pancreatitis.    < from: MRI Abdomen w/wo Cont (09.20.17 @ 11:36) >    IMPRESSION:      No cholelithiasis, choledocholithiasis, or biliary ductal dilatation.  Mild pancreatitis involving pancreatic head.          < end of copied text >                Imaging Personally Reviewed:  [ ] YES      Consultant(s) Notes Reviewed:  [ ] YES     Care Discussed with [ ] Consultants [X ] Patient [ ] Family  [x ]    [x ]  Other; RN

## 2017-09-24 LAB
ALBUMIN SERPL ELPH-MCNC: 3.2 G/DL — LOW (ref 3.3–5)
ALP SERPL-CCNC: 351 U/L — HIGH (ref 40–120)
ALT FLD-CCNC: 148 U/L — HIGH (ref 12–78)
ANION GAP SERPL CALC-SCNC: 11 MMOL/L — SIGNIFICANT CHANGE UP (ref 5–17)
AST SERPL-CCNC: 100 U/L — HIGH (ref 15–37)
BILIRUB DIRECT SERPL-MCNC: 0.47 MG/DL — HIGH (ref 0.05–0.2)
BILIRUB INDIRECT FLD-MCNC: 1.4 MG/DL — HIGH (ref 0.2–1)
BILIRUB SERPL-MCNC: 1.9 MG/DL — HIGH (ref 0.2–1.2)
BUN SERPL-MCNC: 12 MG/DL — SIGNIFICANT CHANGE UP (ref 7–23)
CALCIUM SERPL-MCNC: 9.2 MG/DL — SIGNIFICANT CHANGE UP (ref 8.5–10.1)
CHLORIDE SERPL-SCNC: 106 MMOL/L — SIGNIFICANT CHANGE UP (ref 96–108)
CO2 SERPL-SCNC: 23 MMOL/L — SIGNIFICANT CHANGE UP (ref 22–31)
CREAT SERPL-MCNC: 0.99 MG/DL — SIGNIFICANT CHANGE UP (ref 0.5–1.3)
GLUCOSE SERPL-MCNC: 92 MG/DL — SIGNIFICANT CHANGE UP (ref 70–99)
HCT VFR BLD CALC: 47.6 % — SIGNIFICANT CHANGE UP (ref 39–50)
HGB BLD-MCNC: 15.3 G/DL — SIGNIFICANT CHANGE UP (ref 13–17)
LIDOCAIN IGE QN: 2085 U/L — HIGH (ref 73–393)
MCHC RBC-ENTMCNC: 31.8 PG — SIGNIFICANT CHANGE UP (ref 27–34)
MCHC RBC-ENTMCNC: 32.2 GM/DL — SIGNIFICANT CHANGE UP (ref 32–36)
MCV RBC AUTO: 98.6 FL — SIGNIFICANT CHANGE UP (ref 80–100)
PLATELET # BLD AUTO: 249 K/UL — SIGNIFICANT CHANGE UP (ref 150–400)
POTASSIUM SERPL-MCNC: 3.7 MMOL/L — SIGNIFICANT CHANGE UP (ref 3.5–5.3)
POTASSIUM SERPL-SCNC: 3.7 MMOL/L — SIGNIFICANT CHANGE UP (ref 3.5–5.3)
PROT SERPL-MCNC: 7.5 GM/DL — SIGNIFICANT CHANGE UP (ref 6–8.3)
RBC # BLD: 4.82 M/UL — SIGNIFICANT CHANGE UP (ref 4.2–5.8)
RBC # FLD: 11.3 % — SIGNIFICANT CHANGE UP (ref 11–15)
SODIUM SERPL-SCNC: 140 MMOL/L — SIGNIFICANT CHANGE UP (ref 135–145)
WBC # BLD: 5 K/UL — SIGNIFICANT CHANGE UP (ref 3.8–10.5)
WBC # FLD AUTO: 5 K/UL — SIGNIFICANT CHANGE UP (ref 3.8–10.5)

## 2017-09-24 PROCEDURE — 99231 SBSQ HOSP IP/OBS SF/LOW 25: CPT

## 2017-09-24 RX ORDER — SENNA PLUS 8.6 MG/1
2 TABLET ORAL AT BEDTIME
Qty: 0 | Refills: 0 | Status: DISCONTINUED | OUTPATIENT
Start: 2017-09-24 | End: 2017-09-29

## 2017-09-24 RX ORDER — DOCUSATE SODIUM 100 MG
100 CAPSULE ORAL THREE TIMES A DAY
Qty: 0 | Refills: 0 | Status: DISCONTINUED | OUTPATIENT
Start: 2017-09-24 | End: 2017-09-29

## 2017-09-24 RX ORDER — ELECTROLYTE SOLUTION,INJ
1 VIAL (ML) INTRAVENOUS
Qty: 0 | Refills: 0 | Status: DISCONTINUED | OUTPATIENT
Start: 2017-09-24 | End: 2017-09-24

## 2017-09-24 RX ADMIN — PANTOPRAZOLE SODIUM 40 MILLIGRAM(S): 20 TABLET, DELAYED RELEASE ORAL at 11:00

## 2017-09-24 RX ADMIN — Medication 1 EACH: at 22:46

## 2017-09-24 NOTE — PROGRESS NOTE ADULT - SUBJECTIVE AND OBJECTIVE BOX
Patient is a 62y old  Male who presents with a chief complaint of abdominal pain (16 Sep 2017 04:00)      HPI:  Pt. is a 63 y/o w/o significant pmhx except mild gerd, was in usoh till 2 days ago started to develop r-sided and epigastric pain exacerbated by food.  sx's were getting worse, pt denies such sx's in past.  Does report hx of gerd but those sx's very different and usually very mild states now was also getting pain radiating to r back at times which made him seek care.  In ed has lipase of 1015.   Pt denies  any fever, chills, sob, cp palpitations n/v/d/c. no sick contacts or travels. pt does report etoh ~2 beers qd (32oz) states occasionally more, but never drunk. (16 Sep 2017 04:00)      INTERVAL HPI/OVERNIGHT EVENTS:  The patient denies melena, hematochezia, hematemesis, nausea, vomiting, abdominal pain, constipation, diarrhea, or change in bowel movements     MEDICATIONS  (STANDING):  pantoprazole  Injectable 40 milliGRAM(s) IV Push daily  sodium chloride 0.9%. 1000 milliLiter(s) (100 mL/Hr) IV Continuous <Continuous>  Parenteral Nutrition - Adult 1 Each (84 mL/Hr) TPN Continuous <Continuous>  Parenteral Nutrition - Adult 1 Each (63 mL/Hr) TPN Continuous <Continuous>    MEDICATIONS  (PRN):      FAMILY HISTORY:  No pertinent family history in first degree relatives      Allergies    dust (Sneezing)  No Known Drug Allergies    Intolerances        PMH/PSH:  Pneumothorax on right  GERD (gastroesophageal reflux disease)  H/O chest tube placement  History of hernia repair        REVIEW OF SYSTEMS:  CONSTITUTIONAL: No fever, weight loss, or fatigue  EYES: No eye pain, visual disturbances, or discharge  ENMT:  No difficulty hearing, tinnitus, vertigo; No sinus or throat pain  NECK: No pain or stiffness  BREASTS: No pain, masses, or nipple discharge  RESPIRATORY: No cough, wheezing, chills or hemoptysis; No shortness of breath  CARDIOVASCULAR: No chest pain, palpitations, dizziness, or leg swelling  GASTROINTESTINAL: See above  GENITOURINARY: No dysuria, frequency, hematuria, or incontinence  NEUROLOGICAL: No headaches, memory loss, loss of strength, numbness, or tremors  SKIN: No itching, burning, rashes, or lesions   LYMPH NODES: No enlarged glands  ENDOCRINE: No heat or cold intolerance; No hair loss  MUSCULOSKELETAL: No joint pain or swelling; No muscle, back, or extremity pain  PSYCHIATRIC: No depression, anxiety, mood swings, or difficulty sleeping  HEME/LYMPH: No easy bruising, or bleeding gums  ALLERGY AND IMMUNOLOGIC: No hives or eczema    Vital Signs Last 24 Hrs  T(C): 36.9 (24 Sep 2017 11:18), Max: 37.1 (23 Sep 2017 17:44)  T(F): 98.4 (24 Sep 2017 11:18), Max: 98.8 (23 Sep 2017 17:44)  HR: 66 (24 Sep 2017 11:18) (58 - 66)  BP: 126/71 (24 Sep 2017 11:18) (123/72 - 133/66)  BP(mean): --  RR: 18 (24 Sep 2017 11:18) (16 - 18)  SpO2: 98% (24 Sep 2017 11:18) (96% - 998%)    PHYSICAL EXAM:  GENERAL: NAD, well-groomed, well-developed  HEAD:  Atraumatic, Normocephalic  EYES: EOMI, PERRLA, conjunctiva and sclera clear  NECK: Supple, No JVD, Normal thyroid  NERVOUS SYSTEM:  Alert & Oriented X3, Good concentration;   CHEST/LUNG: Clear to percussion bilaterally; No rales, rhonchi, wheezing, or rubs  HEART: Regular rate and rhythm; No murmurs, rubs, or gallops  ABDOMEN: Soft, Nontender, Nondistended; Bowel sounds present  EXTREMITIES:  2+ Peripheral Pulses, No clubbing, cyanosis, or edema  LYMPH: No lymphadenopathy noted  SKIN: No rashes or lesions    LAB  09-24 @ 07:53  amylase --   lipase 2085 09-23 @ 07:29  amylase --   lipase 2073 09-22 @ 06:52  amylase --   lipase 2064   09-21 @ 07:29  amylase --   lipase 1781   09-20 @ 06:58  amylase --   lipase 1403                           15.3   5.0   )-----------( 249      ( 24 Sep 2017 07:53 )             47.6       CBC:  09-24 @ 07:53  WBC 5.0   Hgb 15.3   Hct 47.6   Plts 249  MCV 98.6  09-22 @ 06:52  WBC 6.3   Hgb 14.2   Hct 44.8   Plts 214  MCV 99.7      Chemistry:  09-24 @ 07:53  Na+ 140  K+ 3.7  Cl- 106  CO2 23  BUN 12  Cr 0.99     09-23 @ 07:29  Na+ 141  K+ 3.8  Cl- 106  CO2 25  BUN 10  Cr 1.04     09-22 @ 06:52  Na+ 141  K+ 3.7  Cl- 108  CO2 23  BUN 8  Cr 1.07     09-21 @ 07:29  Na+ 141  K+ 3.8  Cl- 107  CO2 27  BUN 9  Cr 1.23     09-20 @ 06:58  Na+ 141  K+ 3.9  Cl- 108  CO2 24  BUN 8  Cr 1.26     09-19 @ 06:27  Na+ 141  K+ 3.7  Cl- 106  CO2 26  BUN 6  Cr 1.05         Glucose, Serum: 92 mg/dL (09-24 @ 07:53)  Glucose, Serum: 95 mg/dL (09-23 @ 07:29)  Glucose, Serum: 86 mg/dL (09-22 @ 06:52)  Glucose, Serum: 94 mg/dL (09-21 @ 07:29)  Glucose, Serum: 75 mg/dL (09-20 @ 06:58)  Glucose, Serum: 91 mg/dL (09-19 @ 06:27)      24 Sep 2017 07:53    140    |  106    |  12     ----------------------------<  92     3.7     |  23     |  0.99   23 Sep 2017 07:29    141    |  106    |  10     ----------------------------<  95     3.8     |  25     |  1.04   22 Sep 2017 06:52    141    |  108    |  8      ----------------------------<  86     3.7     |  23     |  1.07   21 Sep 2017 07:29    141    |  107    |  9      ----------------------------<  94     3.8     |  27     |  1.23   20 Sep 2017 06:58    141    |  108    |  8      ----------------------------<  75     3.9     |  24     |  1.26   19 Sep 2017 06:27    141    |  106    |  6      ----------------------------<  91     3.7     |  26     |  1.05     Ca    9.2        24 Sep 2017 07:53  Ca    9.0        23 Sep 2017 07:29  Ca    8.6        22 Sep 2017 06:52  Ca    8.4        21 Sep 2017 07:29  Ca    8.7        20 Sep 2017 06:58  Ca    8.9        19 Sep 2017 06:27  Phos  3.1       23 Sep 2017 07:29  Phos  2.5       21 Sep 2017 07:29  Phos  2.8       20 Sep 2017 06:58  Mg     2.2       23 Sep 2017 07:29  Mg     2.2       21 Sep 2017 07:29  Mg     2.3       20 Sep 2017 06:58    TPro  7.5    /  Alb  3.2    /  TBili  1.9    /  DBili  .47    /  AST  100    /  ALT  148    /  AlkPhos  351    24 Sep 2017 07:53  TPro  7.1    /  Alb  3.1    /  TBili  1.9    /  DBili  x      /  AST  93     /  ALT  143    /  AlkPhos  338    23 Sep 2017 07:29  TPro  7.0    /  Alb  3.0    /  TBili  1.7    /  DBili  .42    /  AST  91     /  ALT  136    /  AlkPhos  315    22 Sep 2017 06:52  TPro  7.1    /  Alb  3.1    /  TBili  2.5    /  DBili  x      /  AST  81     /  ALT  129    /  AlkPhos  311    21 Sep 2017 07:29  TPro  7.2    /  Alb  3.1    /  TBili  2.3    /  DBili  x      /  AST  79     /  ALT  137    /  AlkPhos  286    20 Sep 2017 06:58              CAPILLARY BLOOD GLUCOSE              RADIOLOGY & ADDITIONAL TESTS:    Imaging Personally Reviewed:  [ ] YES  [ ] NO    Consultant(s) Notes Reviewed:  [ ] YES  [ ] NO    Care Discussed with Consultants/Other Providers [ ] YES  [ ] NO

## 2017-09-24 NOTE — PROGRESS NOTE ADULT - SUBJECTIVE AND OBJECTIVE BOX
Patient is a 62y old  Male who presents with a chief complaint of abdominal pain (16 Sep 2017 04:00)      OVERNIGHT EVENTS:      REVIEW OF SYSTEMS: denies chest pain/SOB, diaphoresis, no F/C, cough, dizziness, headache, blurry vision, nausea, vomiting, abdominal pain. Rest unremarkable     MEDICATIONS  (STANDING):  pantoprazole  Injectable 40 milliGRAM(s) IV Push daily  sodium chloride 0.9%. 1000 milliLiter(s) (100 mL/Hr) IV Continuous <Continuous>    MEDICATIONS  (PRN):  morphine  - Injectable 2 milliGRAM(s) IV Push every 4 hours PRN Moderate Pain (4 - 6)      Allergies    Drug Allergies Not Recorded  dust (Sneezing)    Intolerances        SUBJECTIVE: in bed in NAD, stated no pain since last night   Vital Signs Last 24 Hrs  T(C): 36.9 (24 Sep 2017 11:18), Max: 37.1 (23 Sep 2017 17:44)  T(F): 98.4 (24 Sep 2017 11:18), Max: 98.8 (23 Sep 2017 17:44)  HR: 66 (24 Sep 2017 11:18) (58 - 66)  BP: 126/71 (24 Sep 2017 11:18) (123/72 - 133/66)  BP(mean): --  RR: 18 (24 Sep 2017 11:18) (16 - 18)  SpO2: 98% (24 Sep 2017 11:18) (96% - 998%)    PHYSICAL EXAM:  GENERAL: NAD, well-groomed, well-developed  HEAD:  Atraumatic, Normocephalic  EYES: EOMI, PERRLA, conjunctiva and sclera clear  ENMT: No tonsillar erythema, exudates, or enlargement; Moist mucous membranes, Good dentition, No lesions  NECK: Supple, No JVD, Normal thyroid  CHEST/LUNG: Clear to  auscultation bilaterally; No rales, rhonchi, wheezing, or rubs  bilaterally  HEART: Regular rate and rhythm; No murmurs, rubs, or gallops  ABDOMEN: Soft, non tender  Nondistended; Bowel sounds present  EXTREMITIES:  2+ Peripheral Pulses, No clubbing, cyanosis, or edema BL LE  LYMPH: No lymphadenopathy noted  SKIN: No rashes or lesions  NERVOUS SYSTEM:  Alert & Oriented X3, Good concentration; Motor Strength 5/5 B/L upper and lower extremities;   DTRs 2+ intact and symmetric, sensation intact BL                         15.3   5.0   )-----------( 249      ( 24 Sep 2017 07:53 )             47.6   09-24    140  |  106  |  12  ----------------------------<  92  3.7   |  23  |  0.99    Ca    9.2      24 Sep 2017 07:53  Phos  3.1     09-23  Mg     2.2     09-23    TPro  7.5  /  Alb  3.2<L>  /  TBili  1.9<H>  /  DBili  .47<H>  /  AST  100<H>  /  ALT  148<H>  /  AlkPhos  351<H>  09-24  Lipase, Serum in AM (09.24.17 @ 07:53)    Lipase, Serum: 2085 U/L        Cultures;   CAPILLARY BLOOD GLUCOSE        Lipid panel:           RADIOLOGY & ADDITIONAL TESTS:    < from: US Abdomen Complete (09.16.17 @ 11:11) >  IMPRESSION:    Contracted gallbladder.  No biliary ductal dilatation.          < from: CT Abdomen and Pelvis w/ Oral Cont and w/ IV Cont (09.16.17 @ 02:08) >  IMPRESSION:    No CT evidence of pancreatitis.    < from: MRI Abdomen w/wo Cont (09.20.17 @ 11:36) >    IMPRESSION:      No cholelithiasis, choledocholithiasis, or biliary ductal dilatation.  Mild pancreatitis involving pancreatic head.          < end of copied text >                Imaging Personally Reviewed:  [ ] YES      Consultant(s) Notes Reviewed:  [ ] YES     Care Discussed with [ ] Consultants [X ] Patient [ ] Family  [x ]    [x ]  Other; RN

## 2017-09-24 NOTE — PROGRESS NOTE ADULT - PROBLEM SELECTOR PLAN 1
no pain iv hydration  npo   lipase rising but a very mild rise from yesterday to today currently patient on PPN. will continue to monitor..   continue with PRN pain meds  gi consult noted     pt counseled on etoh cessation and pt agrees  Abdominal US noted   ct scan abd noted   mri abd noted    for ERCP once resolved   GI note [place] covering was reviewed

## 2017-09-25 LAB
ALBUMIN SERPL ELPH-MCNC: 3.4 G/DL — SIGNIFICANT CHANGE UP (ref 3.3–5)
ALP SERPL-CCNC: 379 U/L — HIGH (ref 40–120)
ALT FLD-CCNC: 162 U/L — HIGH (ref 12–78)
ANION GAP SERPL CALC-SCNC: 12 MMOL/L — SIGNIFICANT CHANGE UP (ref 5–17)
AST SERPL-CCNC: 106 U/L — HIGH (ref 15–37)
BILIRUB SERPL-MCNC: 2 MG/DL — HIGH (ref 0.2–1.2)
BUN SERPL-MCNC: 14 MG/DL — SIGNIFICANT CHANGE UP (ref 7–23)
CALCIUM SERPL-MCNC: 9.2 MG/DL — SIGNIFICANT CHANGE UP (ref 8.5–10.1)
CHLORIDE SERPL-SCNC: 104 MMOL/L — SIGNIFICANT CHANGE UP (ref 96–108)
CO2 SERPL-SCNC: 23 MMOL/L — SIGNIFICANT CHANGE UP (ref 22–31)
CREAT SERPL-MCNC: 1.13 MG/DL — SIGNIFICANT CHANGE UP (ref 0.5–1.3)
GLUCOSE SERPL-MCNC: 86 MG/DL — SIGNIFICANT CHANGE UP (ref 70–99)
LIDOCAIN IGE QN: 1875 U/L — HIGH (ref 73–393)
MAGNESIUM SERPL-MCNC: 2.2 MG/DL — SIGNIFICANT CHANGE UP (ref 1.6–2.6)
PHOSPHATE SERPL-MCNC: 3.3 MG/DL — SIGNIFICANT CHANGE UP (ref 2.5–4.5)
POTASSIUM SERPL-MCNC: 3.6 MMOL/L — SIGNIFICANT CHANGE UP (ref 3.5–5.3)
POTASSIUM SERPL-SCNC: 3.6 MMOL/L — SIGNIFICANT CHANGE UP (ref 3.5–5.3)
PROT SERPL-MCNC: 7.7 GM/DL — SIGNIFICANT CHANGE UP (ref 6–8.3)
SODIUM SERPL-SCNC: 139 MMOL/L — SIGNIFICANT CHANGE UP (ref 135–145)

## 2017-09-25 PROCEDURE — 74181 MRI ABDOMEN W/O CONTRAST: CPT | Mod: 26

## 2017-09-25 RX ORDER — ELECTROLYTE SOLUTION,INJ
1 VIAL (ML) INTRAVENOUS
Qty: 0 | Refills: 0 | Status: DISCONTINUED | OUTPATIENT
Start: 2017-09-25 | End: 2017-09-25

## 2017-09-25 RX ADMIN — Medication 1 EACH: at 22:01

## 2017-09-25 RX ADMIN — SENNA PLUS 2 TABLET(S): 8.6 TABLET ORAL at 00:05

## 2017-09-25 RX ADMIN — SENNA PLUS 2 TABLET(S): 8.6 TABLET ORAL at 21:58

## 2017-09-25 RX ADMIN — Medication 100 MILLIGRAM(S): at 05:07

## 2017-09-25 RX ADMIN — Medication 100 MILLIGRAM(S): at 00:05

## 2017-09-25 RX ADMIN — PANTOPRAZOLE SODIUM 40 MILLIGRAM(S): 20 TABLET, DELAYED RELEASE ORAL at 12:50

## 2017-09-25 RX ADMIN — Medication 100 MILLIGRAM(S): at 21:57

## 2017-09-25 NOTE — PROGRESS NOTE ADULT - PROBLEM SELECTOR PLAN 1
no pain iv hydration  npo   lipase decreasing   currently patient on PPN. will continue to monitor..   continue with PRN pain meds  gi consult noted     pt counseled on etoh cessation and pt agrees  Abdominal US noted   ct scan abd noted   mri abd noted    for ERCP once resolved   GI note [place] covering was reviewed

## 2017-09-25 NOTE — PROGRESS NOTE ADULT - SUBJECTIVE AND OBJECTIVE BOX
Pt feeling a little better; no pain; Lipase trending down; on PPN      MEDICATIONS  (STANDING):  pantoprazole  Injectable 40 milliGRAM(s) IV Push daily  sodium chloride 0.9%. 1000 milliLiter(s) (100 mL/Hr) IV Continuous <Continuous>  Parenteral Nutrition - Adult 1 Each (63 mL/Hr) TPN Continuous <Continuous>  senna 2 Tablet(s) Oral at bedtime  docusate sodium 100 milliGRAM(s) Oral three times a day    MEDICATIONS  (PRN):      Allergies    dust (Sneezing)  No Known Drug Allergies    Intolerances        Vital Signs Last 24 Hrs  T(C): 36.7 (25 Sep 2017 05:42), Max: 37.1 (24 Sep 2017 18:11)  T(F): 98 (25 Sep 2017 05:42), Max: 98.8 (24 Sep 2017 18:11)  HR: 68 (25 Sep 2017 05:42) (61 - 74)  BP: 118/71 (25 Sep 2017 05:42) (112/75 - 143/78)  BP(mean): --  RR: 16 (25 Sep 2017 05:42) (14 - 18)  SpO2: 97% (25 Sep 2017 05:42) (95% - 98%)    PHYSICAL EXAM:  General: NAD.  CVS: S1, S2  Chest: air entry bilaterally present  Abd: BS present, soft, non-tender      LABS:                        15.3   5.0   )-----------( 249      ( 24 Sep 2017 07:53 )             47.6     09-25    139  |  104  |  14  ----------------------------<  86  3.6   |  23  |  1.13    Ca    9.2      25 Sep 2017 07:05  Phos  3.3     09-25  Mg     2.2     09-25    TPro  7.7  /  Alb  3.4  /  TBili  2.0<H>  /  DBili  x   /  AST  106<H>  /  ALT  162<H>  /  AlkPhos  379<H>  09-25      Renew PPN  Lipase in AM Pt feeling a little better; no pain; Lipase trending down; on PPN      MEDICATIONS  (STANDING):  pantoprazole  Injectable 40 milliGRAM(s) IV Push daily  sodium chloride 0.9%. 1000 milliLiter(s) (100 mL/Hr) IV Continuous <Continuous>  Parenteral Nutrition - Adult 1 Each (63 mL/Hr) TPN Continuous <Continuous>  senna 2 Tablet(s) Oral at bedtime  docusate sodium 100 milliGRAM(s) Oral three times a day    MEDICATIONS  (PRN):      Allergies    dust (Sneezing)  No Known Drug Allergies    Intolerances        Vital Signs Last 24 Hrs  T(C): 36.7 (25 Sep 2017 05:42), Max: 37.1 (24 Sep 2017 18:11)  T(F): 98 (25 Sep 2017 05:42), Max: 98.8 (24 Sep 2017 18:11)  HR: 68 (25 Sep 2017 05:42) (61 - 74)  BP: 118/71 (25 Sep 2017 05:42) (112/75 - 143/78)  BP(mean): --  RR: 16 (25 Sep 2017 05:42) (14 - 18)  SpO2: 97% (25 Sep 2017 05:42) (95% - 98%)    PHYSICAL EXAM:  General: NAD.  CVS: S1, S2  Chest: air entry bilaterally present  Abd: BS present, soft, non-tender      LABS:                        15.3   5.0   )-----------( 249      ( 24 Sep 2017 07:53 )             47.6     09-25    139  |  104  |  14  ----------------------------<  86  3.6   |  23  |  1.13    Ca    9.2      25 Sep 2017 07:05  Phos  3.3     09-25  Mg     2.2     09-25    TPro  7.7  /  Alb  3.4  /  TBili  2.0<H>  /  DBili  x   /  AST  106<H>  /  ALT  162<H>  /  AlkPhos  379<H>  09-25      Renew PPN  Lipase in AM  for MRCP today

## 2017-09-25 NOTE — PROGRESS NOTE ADULT - SUBJECTIVE AND OBJECTIVE BOX
CHIEF COMPLAINT/INTERVAL HISTORY:    Patient is a 62y old  Male who presents with a chief complaint of abdominal pain (16 Sep 2017 04:00)      HPI:  Pt. is a 63 y/o w/o significant pmhx except mild gerd, was in usoh till 2 days ago started to develop r-sided and epigastric pain exacerbated by food.  sx's were getting worse, pt denies such sx's in past.  Does report hx of gerd but those sx's very different and usually very mild states now was also getting pain radiating to r back at times which made him seek care.  In ed has lipase of 1015.   Pt denies  any fever, chills, sob, cp palpitations n/v/d/c. no sick contacts or travels. pt does report etoh ~2 beers qd (32oz) states occasionally more, but never drunk. (16 Sep 2017 04:00)    Overnight issues  mrcp today   SUBJECTIVE & OBJECTIVE: Pt seen and examined at bedside.   ROS:  CONSTITUTIONAL: No fever, weight loss, or fatigue  NECK: No pain or stiffness  RESPIRATORY: No cough, wheezing, chills or hemoptysis; No shortness of breath  CARDIOVASCULAR: No chest pain, palpitations, dizziness, or leg swelling  GASTROINTESTINAL: No abdominal or epigastric pain. No nausea, vomiting, or hematemesis; No diarrhea or constipation. No melena or hematochezia.  GENITOURINARY: No dysuria, frequency, hematuria, or incontinence  NEUROLOGICAL: No headaches, memory loss, loss of strength, numbness, or tremors  SKIN: No itching, burning, rashes, or lesions   ICU Vital Signs Last 24 Hrs  T(C): 36.7 (25 Sep 2017 05:42), Max: 37.1 (24 Sep 2017 18:11)  T(F): 98 (25 Sep 2017 05:42), Max: 98.8 (24 Sep 2017 18:11)  HR: 68 (25 Sep 2017 05:42) (61 - 74)  BP: 118/71 (25 Sep 2017 05:42) (112/75 - 143/78)  BP(mean): --  ABP: --  ABP(mean): --  RR: 16 (25 Sep 2017 05:42) (14 - 16)  SpO2: 97% (25 Sep 2017 05:42) (95% - 98%)        MEDICATIONS  (STANDING):  pantoprazole  Injectable 40 milliGRAM(s) IV Push daily  sodium chloride 0.9%. 1000 milliLiter(s) (100 mL/Hr) IV Continuous <Continuous>  Parenteral Nutrition - Adult 1 Each (63 mL/Hr) TPN Continuous <Continuous>  senna 2 Tablet(s) Oral at bedtime  docusate sodium 100 milliGRAM(s) Oral three times a day  Parenteral Nutrition - Adult 1 Each (63 mL/Hr) TPN Continuous <Continuous>    MEDICATIONS  (PRN):        PHYSICAL EXAM:    GENERAL: NAD, well-groomed, well-developed  HEAD:  Atraumatic, Normocephalic  EYES: EOMI, PERRLA, conjunctiva and sclera clear  ENMT: Moist mucous membranes  NECK: Supple, No JVD  NERVOUS SYSTEM:  Alert & Oriented X3, Motor Strength 5/5 B/L upper and lower extremities; DTRs 2+ intact and symmetric  CHEST/LUNG: Clear to auscultation bilaterally; No rales, rhonchi, wheezing, or rubs  HEART: Regular rate and rhythm; No murmurs, rubs, or gallops  ABDOMEN: Soft, Nontender, Nondistended; Bowel sounds present  EXTREMITIES:  2+ Peripheral Pulses, No clubbing, cyanosis, or edema    LABS:                        15.3   5.0   )-----------( 249      ( 24 Sep 2017 07:53 )             47.6     09-25    139  |  104  |  14  ----------------------------<  86  3.6   |  23  |  1.13    Ca    9.2      25 Sep 2017 07:05  Phos  3.3     09-25  Mg     2.2     09-25    TPro  7.7  /  Alb  3.4  /  TBili  2.0<H>  /  DBili  x   /  AST  106<H>  /  ALT  162<H>  /  AlkPhos  379<H>  09-25          CAPILLARY BLOOD GLUCOSE          RECENT CULTURES:      RADIOLOGY & ADDITIONAL TESTS:  Imaging Personally Reviewed:  [ ] YES      Consultant(s) Notes Reviewed:  [ ] YES     Care Discussed with [ ] Consultants [X ] Patient [ ] Family  [x ]    [x ]  Other; RN  HEALTH ISSUES - PROBLEM Dx:  Preventive measure: Preventive measure  Gastroesophageal reflux disease without esophagitis: Gastroesophageal reflux disease without esophagitis  Acute pancreatitis, unspecified complication status, unspecified pancreatitis type: Acute pancreatitis, unspecified complication status, unspecified pancreatitis type        DVT/GI ppx  Discussed with pt @ bedside

## 2017-09-26 DIAGNOSIS — R79.89 OTHER SPECIFIED ABNORMAL FINDINGS OF BLOOD CHEMISTRY: ICD-10-CM

## 2017-09-26 LAB
ANION GAP SERPL CALC-SCNC: 9 MMOL/L — SIGNIFICANT CHANGE UP (ref 5–17)
BASOPHILS # BLD AUTO: 0.1 K/UL — SIGNIFICANT CHANGE UP (ref 0–0.2)
BASOPHILS NFR BLD AUTO: 1.6 % — SIGNIFICANT CHANGE UP (ref 0–2)
BUN SERPL-MCNC: 14 MG/DL — SIGNIFICANT CHANGE UP (ref 7–23)
CALCIUM SERPL-MCNC: 9 MG/DL — SIGNIFICANT CHANGE UP (ref 8.5–10.1)
CHLORIDE SERPL-SCNC: 104 MMOL/L — SIGNIFICANT CHANGE UP (ref 96–108)
CO2 SERPL-SCNC: 26 MMOL/L — SIGNIFICANT CHANGE UP (ref 22–31)
CREAT SERPL-MCNC: 1.12 MG/DL — SIGNIFICANT CHANGE UP (ref 0.5–1.3)
EOSINOPHIL # BLD AUTO: 0.1 K/UL — SIGNIFICANT CHANGE UP (ref 0–0.5)
EOSINOPHIL NFR BLD AUTO: 1.8 % — SIGNIFICANT CHANGE UP (ref 0–6)
GLUCOSE SERPL-MCNC: 85 MG/DL — SIGNIFICANT CHANGE UP (ref 70–99)
HCT VFR BLD CALC: 48.5 % — SIGNIFICANT CHANGE UP (ref 39–50)
HGB BLD-MCNC: 15.1 G/DL — SIGNIFICANT CHANGE UP (ref 13–17)
LIDOCAIN IGE QN: 1721 U/L — HIGH (ref 73–393)
LYMPHOCYTES # BLD AUTO: 1.4 K/UL — SIGNIFICANT CHANGE UP (ref 1–3.3)
LYMPHOCYTES # BLD AUTO: 25.4 % — SIGNIFICANT CHANGE UP (ref 13–44)
MAGNESIUM SERPL-MCNC: 2 MG/DL — SIGNIFICANT CHANGE UP (ref 1.6–2.6)
MCHC RBC-ENTMCNC: 31 PG — SIGNIFICANT CHANGE UP (ref 27–34)
MCHC RBC-ENTMCNC: 31.2 GM/DL — LOW (ref 32–36)
MCV RBC AUTO: 99.6 FL — SIGNIFICANT CHANGE UP (ref 80–100)
MONOCYTES # BLD AUTO: 0.7 K/UL — SIGNIFICANT CHANGE UP (ref 0–0.9)
MONOCYTES NFR BLD AUTO: 11.9 % — SIGNIFICANT CHANGE UP (ref 2–14)
NEUTROPHILS # BLD AUTO: 3.3 K/UL — SIGNIFICANT CHANGE UP (ref 1.8–7.4)
NEUTROPHILS NFR BLD AUTO: 59.3 % — SIGNIFICANT CHANGE UP (ref 43–77)
PHOSPHATE SERPL-MCNC: 3.6 MG/DL — SIGNIFICANT CHANGE UP (ref 2.5–4.5)
PLATELET # BLD AUTO: 273 K/UL — SIGNIFICANT CHANGE UP (ref 150–400)
POTASSIUM SERPL-MCNC: 3.7 MMOL/L — SIGNIFICANT CHANGE UP (ref 3.5–5.3)
POTASSIUM SERPL-SCNC: 3.7 MMOL/L — SIGNIFICANT CHANGE UP (ref 3.5–5.3)
RBC # BLD: 4.87 M/UL — SIGNIFICANT CHANGE UP (ref 4.2–5.8)
RBC # FLD: 11.3 % — SIGNIFICANT CHANGE UP (ref 11–15)
SODIUM SERPL-SCNC: 139 MMOL/L — SIGNIFICANT CHANGE UP (ref 135–145)
WBC # BLD: 5.6 K/UL — SIGNIFICANT CHANGE UP (ref 3.8–10.5)
WBC # FLD AUTO: 5.6 K/UL — SIGNIFICANT CHANGE UP (ref 3.8–10.5)

## 2017-09-26 RX ORDER — ELECTROLYTE SOLUTION,INJ
1 VIAL (ML) INTRAVENOUS
Qty: 0 | Refills: 0 | Status: DISCONTINUED | OUTPATIENT
Start: 2017-09-26 | End: 2017-09-26

## 2017-09-26 RX ADMIN — Medication 1 EACH: at 22:15

## 2017-09-26 RX ADMIN — PANTOPRAZOLE SODIUM 40 MILLIGRAM(S): 20 TABLET, DELAYED RELEASE ORAL at 11:12

## 2017-09-26 NOTE — PROGRESS NOTE ADULT - SUBJECTIVE AND OBJECTIVE BOX
CHIEF COMPLAINT/INTERVAL HISTORY:    Patient is a 62y old  Male who presents with a chief complaint of abdominal pain (16 Sep 2017 04:00)      HPI:  Pt. is a 63 y/o w/o significant pmhx except mild gerd, was in usoh till 2 days ago started to develop r-sided and epigastric pain exacerbated by food.  sx's were getting worse, pt denies such sx's in past.  Does report hx of gerd but those sx's very different and usually very mild states now was also getting pain radiating to r back at times which made him seek care.  In ed has lipase of 1015.   Pt denies  any fever, chills, sob, cp palpitations n/v/d/c. no sick contacts or travels. pt does report etoh ~2 beers qd (32oz) states occasionally more, but never drunk. (16 Sep 2017 04:00)    Overnight issues    no pain  second gastroenterologist consult appreciated   SUBJECTIVE & OBJECTIVE: Pt seen and examined at bedside.   ROS:  CONSTITUTIONAL: No fever, weight loss, or fatigue  NECK: No pain or stiffness  RESPIRATORY: No cough, wheezing, chills or hemoptysis; No shortness of breath  CARDIOVASCULAR: No chest pain, palpitations, dizziness, or leg swelling  GASTROINTESTINAL: No abdominal or epigastric pain. No nausea, vomiting, or hematemesis; No diarrhea or constipation. No melena or hematochezia.  GENITOURINARY: No dysuria, frequency, hematuria, or incontinence  NEUROLOGICAL: No headaches, memory loss, loss of strength, numbness, or tremors  SKIN: No itching, burning, rashes, or lesions   ICU Vital Signs Last 24 Hrs  T(C): 36.3 (26 Sep 2017 11:41), Max: 37.6 (26 Sep 2017 06:23)  T(F): 97.4 (26 Sep 2017 11:41), Max: 99.6 (26 Sep 2017 06:23)  HR: 73 (26 Sep 2017 11:41) (70 - 73)  BP: 108/78 (26 Sep 2017 11:41) (107/70 - 110/-)  BP(mean): 72 (26 Sep 2017 06:23) (72 - 72)  ABP: --  ABP(mean): --  RR: 16 (26 Sep 2017 11:41) (16 - 18)  SpO2: 93% (26 Sep 2017 11:41) (93% - 99%)        MEDICATIONS  (STANDING):  pantoprazole  Injectable 40 milliGRAM(s) IV Push daily  senna 2 Tablet(s) Oral at bedtime  docusate sodium 100 milliGRAM(s) Oral three times a day  Parenteral Nutrition - Adult 1 Each (63 mL/Hr) TPN Continuous <Continuous>  Parenteral Nutrition - Adult 1 Each (63 mL/Hr) TPN Continuous <Continuous>    MEDICATIONS  (PRN):        PHYSICAL EXAM:    GENERAL: NAD, well-groomed, well-developed  HEAD:  Atraumatic, Normocephalic  EYES: EOMI, PERRLA, conjunctiva and sclera clear  ENMT: Moist mucous membranes  NECK: Supple, No JVD  NERVOUS SYSTEM:  Alert & Oriented X3, Motor Strength 5/5 B/L upper and lower extremities; DTRs 2+ intact and symmetric  CHEST/LUNG: Clear to auscultation bilaterally; No rales, rhonchi, wheezing, or rubs  HEART: Regular rate and rhythm; No murmurs, rubs, or gallops  ABDOMEN: Soft, Nontender, Nondistended; Bowel sounds present  EXTREMITIES:  2+ Peripheral Pulses, No clubbing, cyanosis, or edema    LABS:                        15.1   5.6   )-----------( 273      ( 26 Sep 2017 07:04 )             48.5     09-26    139  |  104  |  14  ----------------------------<  85  3.7   |  26  |  1.12    Ca    9.0      26 Sep 2017 07:04  Phos  3.6     09-26  Mg     2.0     09-26    TPro  7.7  /  Alb  3.4  /  TBili  2.0<H>  /  DBili  x   /  AST  106<H>  /  ALT  162<H>  /  AlkPhos  379<H>  09-25          CAPILLARY BLOOD GLUCOSE          RECENT CULTURES:      RADIOLOGY & ADDITIONAL TESTS:  Imaging Personally Reviewed:  [ ] YES      Consultant(s) Notes Reviewed:  [ ] YES     Care Discussed with [ ] Consultants [X ] Patient [ ] Family  [x ]    [x ]  Other; RN  HEALTH ISSUES - PROBLEM Dx:  LFTs abnormal: LFTs abnormal  Preventive measure: Preventive measure  Gastroesophageal reflux disease without esophagitis: Gastroesophageal reflux disease without esophagitis  Acute pancreatitis, unspecified complication status, unspecified pancreatitis type: Acute pancreatitis, unspecified complication status, unspecified pancreatitis type        DVT/GI ppx  Discussed with pt @ bedside

## 2017-09-26 NOTE — PROGRESS NOTE ADULT - PROBLEM SELECTOR PLAN 1
no pain iv hydration  npo   lipase decreasing   currently patient on PPN. will continue to monitor..   continue with PRN pain meds  second gi consult noted   advance diet?? will speak to gastroenterologist   pt counseled on etoh cessation and pt agrees  Abdominal US noted   ct scan abd noted   mri abd noted    for ERCP once resolved   GI note [place] covering was reviewed

## 2017-09-26 NOTE — CONSULT NOTE ADULT - PROBLEM SELECTOR RECOMMENDATION 2
Will check Anti Nuclear Antibody(HEMA), Anti Smooth muscle antibody( ASMA),  Anti Liver/Kidney Microsomal antibody(LKM1), Anti mitochondrial antibody (AMA), Ceruloplasmin, Alpha-1 Antitrypsin, Iron, TIBC, Ferritin and HEPATITIS PANEL

## 2017-09-26 NOTE — CONSULT NOTE ADULT - SUBJECTIVE AND OBJECTIVE BOX
Chief Complaint:  Patient is a 62y old  Male who presents with a chief complaint of abdominal pain (16 Sep 2017 04:00)      HPI:Pt. is a 63 y/o w/o significant pmhx except mild gerd, was admitted for abd pain with elevated LFTS and amylase lipase  has had MRCP x 2 neg for CBD stone .  patient has been on PPn and labs remain elevated with no abd pain currently     Allergies:  dust (Sneezing)  No Known Drug Allergies      Medications:  pantoprazole  Injectable 40 milliGRAM(s) IV Push daily  senna 2 Tablet(s) Oral at bedtime  docusate sodium 100 milliGRAM(s) Oral three times a day  Parenteral Nutrition - Adult 1 Each TPN Continuous <Continuous>  Parenteral Nutrition - Adult 1 Each TPN Continuous <Continuous>      PMHX/PSHX:  Pneumothorax on right  GERD (gastroesophageal reflux disease)  No pertinent past medical history  H/O chest tube placement  History of hernia repair      Family history:  No pertinent family history in first degree relatives      Social History: (-)TOB (+) ETOH     ROS:     General:  No wt loss, fevers, chills, night sweats, fatigue,   Eyes:  Good vision, no reported pain  ENT:  No sore throat, pain, runny nose, dysphagia  CV:  No pain, palpitations, hypo/hypertension  Resp:  No dyspnea, cough, tachypnea, wheezing  GI:  No pain, No nausea, No vomiting, No diarrhea, No constipation, No weight loss, No fever, No pruritis, No rectal bleeding, No tarry stools, No dysphagia,  :  No pain, bleeding, incontinence, nocturia  Muscle:  No pain, weakness  Breast:  No pain, abscess, mass, discharge  Neuro:  No weakness, tingling, memory problems  Psych:  No fatigue, insomnia, mood problems, depression  Endocrine:  No polyuria, polydipsia, cold/heat intolerance  Heme:  No petechiae, ecchymosis, easy bruisability  Skin:  No rash, tattoos, scars, edema      PHYSICAL EXAM:   Vital Signs:  Vital Signs Last 24 Hrs  T(C): 36.3 (26 Sep 2017 11:41), Max: 37.6 (26 Sep 2017 06:23)  T(F): 97.4 (26 Sep 2017 11:41), Max: 99.6 (26 Sep 2017 06:23)  HR: 73 (26 Sep 2017 11:41) (70 - 73)  BP: 108/78 (26 Sep 2017 11:41) (107/70 - 110/-)  BP(mean): 72 (26 Sep 2017 06:23) (72 - 72)  RR: 16 (26 Sep 2017 11:41) (16 - 18)  SpO2: 93% (26 Sep 2017 11:41) (93% - 99%)  Daily     Daily     GENERAL:  Appears stated age, well-groomed, well-nourished, no distress  HEENT:  NC/AT,  conjunctivae clear and pink, no thyromegaly, nodules, adenopathy, no JVD, sclera -anicteric  CHEST:  Full & symmetric excursion, no increased effort, breath sounds clear  HEART:  Regular rhythm, S1, S2, no murmur/rub/S3/S4, no abdominal bruit, no edema  ABDOMEN:  Soft, non-tender, non-distended, normoactive bowel sounds,  no masses , no hepatosplenomegaly, no signs of chronic liver disease  EXTREMITIES:  no cyanosis, clubbing or edema  SKIN:  No rash/erythema/ecchymoses/petechiae/wounds/abscess/warm/dry  NEURO:  Alert, oriented, no asterixis, no tremor, no encephalopathy    LABS:                        15.1   5.6   )-----------( 273      ( 26 Sep 2017 07:04 )             48.5     09-26    139  |  104  |  14  ----------------------------<  85  3.7   |  26  |  1.12    Ca    9.0      26 Sep 2017 07:04  Phos  3.6     09-26  Mg     2.0     09-26    TPro  7.7  /  Alb  3.4  /  TBili  2.0<H>  /  DBili  x   /  AST  106<H>  /  ALT  162<H>  /  AlkPhos  379<H>  09-25    LIVER FUNCTIONS - ( 25 Sep 2017 07:05 )  Alb: 3.4 g/dL / Pro: 7.7 gm/dL / ALK PHOS: 379 U/L / ALT: 162 U/L / AST: 106 U/L / GGT: x              Lipase [id number]           Imaging:    < from: MRI Abdomen w/o Cont (09.25.17 @ 11:39) >    EXAM:  MRI ABDOMEN W O CONT                            PROCEDURE DATE:  09/25/2017          INTERPRETATION:  CLINICAL INFORMATION: Pancreatitis    COMPARISON: CT dated 9/16/2017 and MRI dated 9/20/2017    PROCEDURE:    The following sequences were obtained:  1. Axial LAVA, Dualecho in and out-of-phase, 2D FIESTA, and T2 SSFSE  2. Coronal T2 SSFSE  3. 3D and radial MRCP    FINDINGS:    There are no focal hepatic lesions identified.  No intra or extrahepatic   biliary dilatation is noted. The CBDmeasures 3 mm. No intraductal   biliary calculi are identified. The gallbladder is visualized without   stones.    There is mild fluid around the pancreatic head. The pancreas demonstrates   mildly heterogeneous T1 signal. The pancreatic duct is normal in caliber.    The spleen, adrenal glands, and kidneys are unremarkable in appearance.    There is no retroperitoneal adenopathy. There is no ascites.    IMPRESSION:    No cholelithiasis, choledocholithiasis, or biliary ductal dilatation.  Mild pancreatitis.  MARGARETTE LE M.D., ATTENDING RADIOLOGIST  This document has been electronically signed. Sep 25 2017 12:11PM        < from: CT Abdomen and Pelvis w/ Oral Cont and w/ IV Cont (09.16.17 @ 02:08) >  EXAM:  CT ABDOMEN AND PELVIS OC IC                            PROCEDURE DATE:  09/16/2017          INTERPRETATION:  CT ABDOMEN AND PELVIS DATED 9/16/2017.    CLINICAL INFORMATION:  62-year-old male with epigastric pain, elevated   lipase, and history of alcohol abuse.    TECHNIQUE:  Axial CT images through the abdomen and pelvis are acquired   with administration of oral and intravenous contrast. Images are   reformatted in the sagittal and coronal planes. 85cc of Omnipaque 350   were administered without event.  15cc were discarded.    No prior studies are available for comparison.    FINDINGS:    There is bibasilar dependent atelectasis and a band of platelike   atelectasis in the right middle lobe.    The liver is mildly enlarged but otherwise unremarkable. The gallbladder   is contracted. There is no biliary ductal dilatation. The pancreas,   spleen, adrenal glands, and kidneys are unremarkable.     There is no bowel obstruction, overt bowel wall thickening, or mesenteric   inflammatory change. A normal appendix is identified. There is no   pneumoperitoneum, ascites, or loculated collection.     There is no significant abdominal or pelvic lymphadenopathy. The   abdominal aorta is normal in caliber.    There is a tiny fat containing umbilical hernia. Incidental note is made   of small bilateral hydroceles.    The osseous structures are unremarkable apart from mild degenerative   changes of the spine.    IMPRESSION:    No CT evidence of pancreatitis.  GRAZYNA TIJERINA M.D.; ATTENDING RADIOLOGIST  This document has been electronically signed. Sep 16 2017  2:47AM

## 2017-09-26 NOTE — PROGRESS NOTE ADULT - SUBJECTIVE AND OBJECTIVE BOX
Pt with very slowly decreasing lipase  MRCP show mild pancreatitis  Appreciate biliary consult by Dr Rajan      MEDICATIONS  (STANDING):  pantoprazole  Injectable 40 milliGRAM(s) IV Push daily  senna 2 Tablet(s) Oral at bedtime  docusate sodium 100 milliGRAM(s) Oral three times a day  Parenteral Nutrition - Adult 1 Each (63 mL/Hr) TPN Continuous <Continuous>  Parenteral Nutrition - Adult 1 Each (63 mL/Hr) TPN Continuous <Continuous>    MEDICATIONS  (PRN):      Allergies    dust (Sneezing)  No Known Drug Allergies    Intolerances        Vital Signs Last 24 Hrs  T(C): 36.8 (26 Sep 2017 17:45), Max: 37.6 (26 Sep 2017 06:23)  T(F): 98.2 (26 Sep 2017 17:45), Max: 99.6 (26 Sep 2017 06:23)  HR: 73 (26 Sep 2017 17:45) (70 - 73)  BP: 107/68 (26 Sep 2017 17:45) (107/68 - 110/-)  BP(mean): 72 (26 Sep 2017 06:23) (72 - 72)  RR: 18 (26 Sep 2017 17:45) (16 - 18)  SpO2: 97% (26 Sep 2017 17:45) (93% - 99%)    PHYSICAL EXAM:  General: NAD.  CVS: S1, S2  Chest: air entry bilaterally present  Abd: BS present, soft, non-tender      LABS:                        15.1   5.6   )-----------( 273      ( 26 Sep 2017 07:04 )             48.5     09-26    139  |  104  |  14  ----------------------------<  85  3.7   |  26  |  1.12    Ca    9.0      26 Sep 2017 07:04  Phos  3.6     09-26  Mg     2.0     09-26    TPro  7.7  /  Alb  3.4  /  TBili  2.0<H>  /  DBili  x   /  AST  106<H>  /  ALT  162<H>  /  AlkPhos  379<H>  09-25        Renew PPN  follow lipase  several lab tests ordered by Dr Rajan

## 2017-09-27 LAB
A1AT SERPL-MCNC: 175 MG/DL — SIGNIFICANT CHANGE UP (ref 90–200)
ANA PAT FLD IF-IMP: ABNORMAL
ANA TITR SER: ABNORMAL
ANION GAP SERPL CALC-SCNC: 11 MMOL/L — SIGNIFICANT CHANGE UP (ref 5–17)
BUN SERPL-MCNC: 14 MG/DL — SIGNIFICANT CHANGE UP (ref 7–23)
CALCIUM SERPL-MCNC: 9.7 MG/DL — SIGNIFICANT CHANGE UP (ref 8.5–10.1)
CERULOPLASMIN SERPL-MCNC: 38 MG/DL — SIGNIFICANT CHANGE UP (ref 20–60)
CHLORIDE SERPL-SCNC: 102 MMOL/L — SIGNIFICANT CHANGE UP (ref 96–108)
CO2 SERPL-SCNC: 26 MMOL/L — SIGNIFICANT CHANGE UP (ref 22–31)
CREAT SERPL-MCNC: 1.23 MG/DL — SIGNIFICANT CHANGE UP (ref 0.5–1.3)
GLUCOSE SERPL-MCNC: 88 MG/DL — SIGNIFICANT CHANGE UP (ref 70–99)
HAV IGM SER-ACNC: SIGNIFICANT CHANGE UP
HBV CORE IGM SER-ACNC: SIGNIFICANT CHANGE UP
HBV SURFACE AG SER-ACNC: SIGNIFICANT CHANGE UP
HCV AB S/CO SERPL IA: 0.09 S/CO — SIGNIFICANT CHANGE UP
HCV AB SERPL-IMP: SIGNIFICANT CHANGE UP
LIDOCAIN IGE QN: 1772 U/L — HIGH (ref 73–393)
MAGNESIUM SERPL-MCNC: 2.1 MG/DL — SIGNIFICANT CHANGE UP (ref 1.6–2.6)
PHOSPHATE SERPL-MCNC: 3.5 MG/DL — SIGNIFICANT CHANGE UP (ref 2.5–4.5)
POTASSIUM SERPL-MCNC: 3.9 MMOL/L — SIGNIFICANT CHANGE UP (ref 3.5–5.3)
POTASSIUM SERPL-SCNC: 3.9 MMOL/L — SIGNIFICANT CHANGE UP (ref 3.5–5.3)
SMOOTH MUSCLE AB SER-ACNC: SIGNIFICANT CHANGE UP
SODIUM SERPL-SCNC: 139 MMOL/L — SIGNIFICANT CHANGE UP (ref 135–145)

## 2017-09-27 PROCEDURE — 78226 HEPATOBILIARY SYSTEM IMAGING: CPT | Mod: 26

## 2017-09-27 RX ORDER — ELECTROLYTE SOLUTION,INJ
1 VIAL (ML) INTRAVENOUS
Qty: 0 | Refills: 0 | Status: DISCONTINUED | OUTPATIENT
Start: 2017-09-27 | End: 2017-09-27

## 2017-09-27 RX ADMIN — SENNA PLUS 2 TABLET(S): 8.6 TABLET ORAL at 21:15

## 2017-09-27 RX ADMIN — PANTOPRAZOLE SODIUM 40 MILLIGRAM(S): 20 TABLET, DELAYED RELEASE ORAL at 11:40

## 2017-09-27 RX ADMIN — Medication 100 MILLIGRAM(S): at 21:15

## 2017-09-27 RX ADMIN — Medication 100 MILLIGRAM(S): at 05:05

## 2017-09-27 RX ADMIN — Medication 1 EACH: at 22:16

## 2017-09-27 NOTE — PROGRESS NOTE ADULT - SUBJECTIVE AND OBJECTIVE BOX
Pt denies abd pain - still with elevated Lipase  Lipase, Serum: 1772 U/L (09.27.17 @ 07:55)          MEDICATIONS  (STANDING):  pantoprazole  Injectable 40 milliGRAM(s) IV Push daily  senna 2 Tablet(s) Oral at bedtime  docusate sodium 100 milliGRAM(s) Oral three times a day  Parenteral Nutrition - Adult 1 Each (63 mL/Hr) TPN Continuous <Continuous>    MEDICATIONS  (PRN):      Allergies    dust (Sneezing)  No Known Drug Allergies    Intolerances        Vital Signs Last 24 Hrs  T(C): 36.6 (27 Sep 2017 05:36), Max: 37.2 (26 Sep 2017 23:43)  T(F): 97.8 (27 Sep 2017 05:36), Max: 98.9 (26 Sep 2017 23:43)  HR: 73 (27 Sep 2017 05:36) (71 - 73)  BP: 117/66 (27 Sep 2017 05:36) (107/68 - 125/69)  BP(mean): --  RR: 16 (27 Sep 2017 05:36) (16 - 18)  SpO2: 98% (27 Sep 2017 05:36) (93% - 98%)    PHYSICAL EXAM:  General: NAD.  CVS: S1, S2  Chest: air entry bilaterally present  Abd: BS present, soft, non-tender      LABS:                        15.1   5.6   )-----------( 273      ( 26 Sep 2017 07:04 )             48.5     09-27    139  |  102  |  14  ----------------------------<  88  3.9   |  26  |  1.23    Ca    9.7      27 Sep 2017 07:55  Phos  3.5     09-27  Mg     2.1     09-27      Appreciate biliary consult - await HEMA. anti sm muscle antibody, AMA, etc  Alk phos is elevated as well as mildly elevated LFT's  will try clear liquids today to see if pt develops pain

## 2017-09-27 NOTE — PROGRESS NOTE ADULT - PROBLEM SELECTOR PLAN 1
no pain iv hydration  diet advanced    lipase decreasing   currently patient on PPN. will continue to monitor..   continue with PRN pain meds  second gi consult noted     pt counseled on etoh cessation and pt agrees  Abdominal US noted   ct scan abd noted   mri abd noted    for ERCP once resolved   GI note [place] covering was reviewed

## 2017-09-27 NOTE — PROGRESS NOTE ADULT - SUBJECTIVE AND OBJECTIVE BOX
CHIEF COMPLAINT/INTERVAL HISTORY:    Patient is a 62y old  Male who presents with a chief complaint of abdominal pain (16 Sep 2017 04:00)      HPI:  Pt. is a 61 y/o w/o significant pmhx except mild gerd, was in usoh till 2 days ago started to develop r-sided and epigastric pain exacerbated by food.  sx's were getting worse, pt denies such sx's in past.  Does report hx of gerd but those sx's very different and usually very mild states now was also getting pain radiating to r back at times which made him seek care.  In ed has lipase of 1015.   Pt denies  any fever, chills, sob, cp palpitations n/v/d/c. no sick contacts or travels. pt does report etoh ~2 beers qd (32oz) states occasionally more, but never drunk. (16 Sep 2017 04:00)    Overnight issues  no pain - started on clear liquids by gastroenterologist   SUBJECTIVE & OBJECTIVE: Pt seen and examined at bedside.   ROS:  CONSTITUTIONAL: No fever, weight loss, or fatigue  NECK: No pain or stiffness  RESPIRATORY: No cough, wheezing, chills or hemoptysis; No shortness of breath  CARDIOVASCULAR: No chest pain, palpitations, dizziness, or leg swelling  GASTROINTESTINAL: No abdominal or epigastric pain. No nausea, vomiting, or hematemesis; No diarrhea or constipation. No melena or hematochezia.  GENITOURINARY: No dysuria, frequency, hematuria, or incontinence  NEUROLOGICAL: No headaches, memory loss, loss of strength, numbness, or tremors  SKIN: No itching, burning, rashes, or lesions   ICU Vital Signs Last 24 Hrs  T(C): 36.7 (27 Sep 2017 11:39), Max: 37.2 (26 Sep 2017 23:43)  T(F): 98 (27 Sep 2017 11:39), Max: 98.9 (26 Sep 2017 23:43)  HR: 73 (27 Sep 2017 05:36) (71 - 73)  BP: 134/72 (27 Sep 2017 11:39) (107/68 - 134/72)  BP(mean): --  ABP: --  ABP(mean): --  RR: 16 (27 Sep 2017 11:39) (16 - 18)  SpO2: 96% (27 Sep 2017 11:39) (96% - 98%)        MEDICATIONS  (STANDING):  pantoprazole  Injectable 40 milliGRAM(s) IV Push daily  senna 2 Tablet(s) Oral at bedtime  docusate sodium 100 milliGRAM(s) Oral three times a day  Parenteral Nutrition - Adult 1 Each (63 mL/Hr) TPN Continuous <Continuous>  Parenteral Nutrition - Adult 1 Each (63 mL/Hr) TPN Continuous <Continuous>    MEDICATIONS  (PRN):        PHYSICAL EXAM:    GENERAL: NAD, well-groomed, well-developed  HEAD:  Atraumatic, Normocephalic  EYES: EOMI, PERRLA, conjunctiva and sclera clear  ENMT: Moist mucous membranes  NECK: Supple, No JVD  NERVOUS SYSTEM:  Alert & Oriented X3, Motor Strength 5/5 B/L upper and lower extremities; DTRs 2+ intact and symmetric  CHEST/LUNG: Clear to auscultation bilaterally; No rales, rhonchi, wheezing, or rubs  HEART: Regular rate and rhythm; No murmurs, rubs, or gallops  ABDOMEN: Soft, Nontender, Nondistended; Bowel sounds present  EXTREMITIES:  2+ Peripheral Pulses, No clubbing, cyanosis, or edema    LABS:                        15.1   5.6   )-----------( 273      ( 26 Sep 2017 07:04 )             48.5     09-27    139  |  102  |  14  ----------------------------<  88  3.9   |  26  |  1.23    Ca    9.7      27 Sep 2017 07:55  Phos  3.5     09-27  Mg     2.1     09-27            CAPILLARY BLOOD GLUCOSE          RECENT CULTURES:      RADIOLOGY & ADDITIONAL TESTS:  Imaging Personally Reviewed:  [ ] YES      Consultant(s) Notes Reviewed:  [ ] YES     Care Discussed with [ ] Consultants [X ] Patient [ ] Family  [x ]    [x ]  Other; RN  HEALTH ISSUES - PROBLEM Dx:  LFTs abnormal: LFTs abnormal  Preventive measure: Preventive measure  Gastroesophageal reflux disease without esophagitis: Gastroesophageal reflux disease without esophagitis  Acute pancreatitis, unspecified complication status, unspecified pancreatitis type: Acute pancreatitis, unspecified complication status, unspecified pancreatitis type        DVT/GI ppx  Discussed with pt @ bedside

## 2017-09-28 LAB
ALBUMIN SERPL ELPH-MCNC: 3.3 G/DL — SIGNIFICANT CHANGE UP (ref 3.3–5)
ALP SERPL-CCNC: 370 U/L — HIGH (ref 40–120)
ALT FLD-CCNC: 158 U/L — HIGH (ref 12–78)
ANION GAP SERPL CALC-SCNC: 10 MMOL/L — SIGNIFICANT CHANGE UP (ref 5–17)
AST SERPL-CCNC: 93 U/L — HIGH (ref 15–37)
BASOPHILS # BLD AUTO: 0.1 K/UL — SIGNIFICANT CHANGE UP (ref 0–0.2)
BASOPHILS NFR BLD AUTO: 1.6 % — SIGNIFICANT CHANGE UP (ref 0–2)
BILIRUB DIRECT SERPL-MCNC: 0.49 MG/DL — HIGH (ref 0.05–0.2)
BILIRUB INDIRECT FLD-MCNC: 1.9 MG/DL — HIGH (ref 0.2–1)
BILIRUB SERPL-MCNC: 2.4 MG/DL — HIGH (ref 0.2–1.2)
BUN SERPL-MCNC: 14 MG/DL — SIGNIFICANT CHANGE UP (ref 7–23)
CALCIUM SERPL-MCNC: 9.1 MG/DL — SIGNIFICANT CHANGE UP (ref 8.5–10.1)
CHLORIDE SERPL-SCNC: 104 MMOL/L — SIGNIFICANT CHANGE UP (ref 96–108)
CO2 SERPL-SCNC: 24 MMOL/L — SIGNIFICANT CHANGE UP (ref 22–31)
CREAT SERPL-MCNC: 1.08 MG/DL — SIGNIFICANT CHANGE UP (ref 0.5–1.3)
EOSINOPHIL # BLD AUTO: 0.1 K/UL — SIGNIFICANT CHANGE UP (ref 0–0.5)
EOSINOPHIL NFR BLD AUTO: 1.8 % — SIGNIFICANT CHANGE UP (ref 0–6)
GLUCOSE SERPL-MCNC: 90 MG/DL — SIGNIFICANT CHANGE UP (ref 70–99)
HCT VFR BLD CALC: 46.3 % — SIGNIFICANT CHANGE UP (ref 39–50)
HGB BLD-MCNC: 15.1 G/DL — SIGNIFICANT CHANGE UP (ref 13–17)
LIDOCAIN IGE QN: 2011 U/L — HIGH (ref 73–393)
LKM AB SER-ACNC: 0.9 UNITS — SIGNIFICANT CHANGE UP (ref 0–20)
LYMPHOCYTES # BLD AUTO: 1.4 K/UL — SIGNIFICANT CHANGE UP (ref 1–3.3)
LYMPHOCYTES # BLD AUTO: 24.4 % — SIGNIFICANT CHANGE UP (ref 13–44)
MAGNESIUM SERPL-MCNC: 2.1 MG/DL — SIGNIFICANT CHANGE UP (ref 1.6–2.6)
MCHC RBC-ENTMCNC: 32.2 PG — SIGNIFICANT CHANGE UP (ref 27–34)
MCHC RBC-ENTMCNC: 32.7 GM/DL — SIGNIFICANT CHANGE UP (ref 32–36)
MCV RBC AUTO: 98.4 FL — SIGNIFICANT CHANGE UP (ref 80–100)
MONOCYTES # BLD AUTO: 0.8 K/UL — SIGNIFICANT CHANGE UP (ref 0–0.9)
MONOCYTES NFR BLD AUTO: 13.8 % — SIGNIFICANT CHANGE UP (ref 2–14)
NEUTROPHILS # BLD AUTO: 3.2 K/UL — SIGNIFICANT CHANGE UP (ref 1.8–7.4)
NEUTROPHILS NFR BLD AUTO: 58.4 % — SIGNIFICANT CHANGE UP (ref 43–77)
PHOSPHATE SERPL-MCNC: 3.5 MG/DL — SIGNIFICANT CHANGE UP (ref 2.5–4.5)
PLATELET # BLD AUTO: 270 K/UL — SIGNIFICANT CHANGE UP (ref 150–400)
POTASSIUM SERPL-MCNC: 3.4 MMOL/L — LOW (ref 3.5–5.3)
POTASSIUM SERPL-SCNC: 3.4 MMOL/L — LOW (ref 3.5–5.3)
PROT SERPL-MCNC: 7.7 GM/DL — SIGNIFICANT CHANGE UP (ref 6–8.3)
RBC # BLD: 4.71 M/UL — SIGNIFICANT CHANGE UP (ref 4.2–5.8)
RBC # FLD: 11.1 % — SIGNIFICANT CHANGE UP (ref 11–15)
SODIUM SERPL-SCNC: 138 MMOL/L — SIGNIFICANT CHANGE UP (ref 135–145)
WBC # BLD: 5.5 K/UL — SIGNIFICANT CHANGE UP (ref 3.8–10.5)
WBC # FLD AUTO: 5.5 K/UL — SIGNIFICANT CHANGE UP (ref 3.8–10.5)

## 2017-09-28 RX ORDER — ELECTROLYTE SOLUTION,INJ
1 VIAL (ML) INTRAVENOUS
Qty: 0 | Refills: 0 | Status: DISCONTINUED | OUTPATIENT
Start: 2017-09-28 | End: 2017-09-28

## 2017-09-28 RX ADMIN — Medication 100 MILLIGRAM(S): at 22:04

## 2017-09-28 RX ADMIN — PANTOPRAZOLE SODIUM 40 MILLIGRAM(S): 20 TABLET, DELAYED RELEASE ORAL at 11:15

## 2017-09-28 RX ADMIN — SENNA PLUS 2 TABLET(S): 8.6 TABLET ORAL at 22:04

## 2017-09-28 RX ADMIN — Medication 1 EACH: at 22:07

## 2017-09-28 NOTE — PROGRESS NOTE ADULT - PROBLEM SELECTOR PLAN 1
no pain iv hydration  diet advanced to full diet    lipase decreasing   currently patient on PPN. will continue to monitor..   continue with PRN pain meds  second gi consult noted     pt counseled on etoh cessation and pt agrees  Abdominal US noted   ct scan abd noted   mri abd noted    for ERCP once resolved   GI note [place] covering was reviewed

## 2017-09-28 NOTE — CHART NOTE - NSCHARTNOTEFT_GEN_A_CORE
Upon Nutritional Assessment by the Registered Dietitian your patient was determined to meet criteria / has evidence of the following diagnosis/diagnoses:          [ ]  Mild Protein Calorie Malnutrition        [ ]  Moderate Protein Calorie Malnutrition        [x ] Severe Protein Calorie Malnutrition        [ ] Unspecified Protein Calorie Malnutrition        [ ] Underweight / BMI <19        [ ] Morbid Obesity / BMI > 40      Findings as based on:  •  Comprehensive nutrition assessment and consultation  •  Calorie counts (nutrient intake analysis)  •  Food acceptance and intake status from observations by staff  •  Follow up  •  Patient education  •  Intervention secondary to interdisciplinary rounds  •   concerns      Treatment:    The following diet has been recommended:  Clear liquids/Ensure Clear 3 x day(240kcal & 8gm protein) & consider TPN if pt remains on NPO/Clear liquids    PROVIDER Section:     By signing this assessment you are acknowledging and agree with the diagnosis/diagnoses assigned by the Registered Dietitian    Comments:

## 2017-09-28 NOTE — PROGRESS NOTE ADULT - SUBJECTIVE AND OBJECTIVE BOX
CHIEF COMPLAINT/INTERVAL HISTORY:    Patient is a 62y old  Male who presents with a chief complaint of abdominal pain (16 Sep 2017 04:00)      HPI:  Pt. is a 63 y/o w/o significant pmhx except mild gerd, was in usoh till 2 days ago started to develop r-sided and epigastric pain exacerbated by food.  sx's were getting worse, pt denies such sx's in past.  Does report hx of gerd but those sx's very different and usually very mild states now was also getting pain radiating to r back at times which made him seek care.  In ed has lipase of 1015.   Pt denies  any fever, chills, sob, cp palpitations n/v/d/c. no sick contacts or travels. pt does report etoh ~2 beers qd (32oz) states occasionally more, but never drunk. (16 Sep 2017 04:00)    Overnight issues  no abdominal pain despite clear liquid diet and a sandwich   SUBJECTIVE & OBJECTIVE: Pt seen and examined at bedside.   ROS:  CONSTITUTIONAL: No fever, weight loss, or fatigue  NECK: No pain or stiffness  RESPIRATORY: No cough, wheezing, chills or hemoptysis; No shortness of breath  CARDIOVASCULAR: No chest pain, palpitations, dizziness, or leg swelling  GASTROINTESTINAL: No abdominal or epigastric pain. No nausea, vomiting, or hematemesis; No diarrhea or constipation. No melena or hematochezia.  GENITOURINARY: No dysuria, frequency, hematuria, or incontinence  NEUROLOGICAL: No headaches, memory loss, loss of strength, numbness, or tremors  SKIN: No itching, burning, rashes, or lesions   ICU Vital Signs Last 24 Hrs  T(C): 36.3 (28 Sep 2017 05:10), Max: 37.1 (27 Sep 2017 17:40)  T(F): 97.4 (28 Sep 2017 05:10), Max: 98.8 (27 Sep 2017 17:40)  HR: 80 (28 Sep 2017 11:17) (70 - 81)  BP: 117/75 (28 Sep 2017 11:17) (104/69 - 117/75)  BP(mean): --  ABP: --  ABP(mean): --  RR: 16 (28 Sep 2017 11:17) (15 - 16)  SpO2: 97% (28 Sep 2017 11:17) (95% - 99%)        MEDICATIONS  (STANDING):  pantoprazole  Injectable 40 milliGRAM(s) IV Push daily  senna 2 Tablet(s) Oral at bedtime  docusate sodium 100 milliGRAM(s) Oral three times a day  Parenteral Nutrition - Adult 1 Each (63 mL/Hr) TPN Continuous <Continuous>  Parenteral Nutrition - Adult 1 Each (63 mL/Hr) TPN Continuous <Continuous>    MEDICATIONS  (PRN):        PHYSICAL EXAM:    GENERAL: NAD, well-groomed, well-developed  HEAD:  Atraumatic, Normocephalic  EYES: EOMI, PERRLA, conjunctiva and sclera clear  ENMT: Moist mucous membranes  NECK: Supple, No JVD  NERVOUS SYSTEM:  Alert & Oriented X3, Motor Strength 5/5 B/L upper and lower extremities; DTRs 2+ intact and symmetric  CHEST/LUNG: Clear to auscultation bilaterally; No rales, rhonchi, wheezing, or rubs  HEART: Regular rate and rhythm; No murmurs, rubs, or gallops  ABDOMEN: Soft, Nontender, Nondistended; Bowel sounds present  EXTREMITIES:  2+ Peripheral Pulses, No clubbing, cyanosis, or edema    LABS:                        15.1   5.5   )-----------( 270      ( 28 Sep 2017 06:21 )             46.3     09-28    138  |  104  |  14  ----------------------------<  90  3.4<L>   |  24  |  1.08    Ca    9.1      28 Sep 2017 06:21  Phos  3.5     09-28  Mg     2.1     09-28    TPro  7.7  /  Alb  3.3  /  TBili  2.4<H>  /  DBili  .49<H>  /  AST  93<H>  /  ALT  158<H>  /  AlkPhos  370<H>  09-28          CAPILLARY BLOOD GLUCOSE          RECENT CULTURES:      RADIOLOGY & ADDITIONAL TESTS:  Imaging Personally Reviewed:  [ ] YES      Consultant(s) Notes Reviewed:  [ ] YES     Care Discussed with [ ] Consultants [X ] Patient [ ] Family  [x ]    [x ]  Other; RN  HEALTH ISSUES - PROBLEM Dx:  LFTs abnormal: LFTs abnormal  Preventive measure: Preventive measure  Gastroesophageal reflux disease without esophagitis: Gastroesophageal reflux disease without esophagitis  Acute pancreatitis, unspecified complication status, unspecified pancreatitis type: Acute pancreatitis, unspecified complication status, unspecified pancreatitis type        DVT/GI ppx  Discussed with pt @ bedside

## 2017-09-28 NOTE — PROGRESS NOTE ADULT - SUBJECTIVE AND OBJECTIVE BOX
Pt without c/o abdominal pain - lipase rising    MEDICATIONS  (STANDING):  pantoprazole  Injectable 40 milliGRAM(s) IV Push daily  senna 2 Tablet(s) Oral at bedtime  docusate sodium 100 milliGRAM(s) Oral three times a day  Parenteral Nutrition - Adult 1 Each (63 mL/Hr) TPN Continuous <Continuous>  Parenteral Nutrition - Adult 1 Each (63 mL/Hr) TPN Continuous <Continuous>    MEDICATIONS  (PRN):      Allergies    dust (Sneezing)  No Known Drug Allergies    Intolerances        Vital Signs Last 24 Hrs  T(C): 36.3 (28 Sep 2017 05:10), Max: 37.1 (27 Sep 2017 17:40)  T(F): 97.4 (28 Sep 2017 05:10), Max: 98.8 (27 Sep 2017 17:40)  HR: 80 (28 Sep 2017 11:17) (70 - 81)  BP: 117/75 (28 Sep 2017 11:17) (104/69 - 117/75)  BP(mean): --  RR: 16 (28 Sep 2017 11:17) (15 - 16)  SpO2: 97% (28 Sep 2017 11:17) (95% - 99%)    PHYSICAL EXAM:  General: NAD.  CVS: S1, S2  Chest: air entry bilaterally present  Abd: BS present, soft, non-tender      LABS:                        15.1   5.5   )-----------( 270      ( 28 Sep 2017 06:21 )             46.3     09-28    138  |  104  |  14  ----------------------------<  90  3.4<L>   |  24  |  1.08    Ca    9.1      28 Sep 2017 06:21  Phos  3.5     09-28  Mg     2.1     09-28    TPro  7.7  /  Alb  3.3  /  TBili  2.4<H>  /  DBili  .49<H>  /  AST  93<H>  /  ALT  158<H>  /  AlkPhos  370<H>  09-28      Lipase, Serum: 2011 U/L (09.28.17 @ 06:21)    Continue clear liquids for now as pt is assymptomatic  Renew PPN  Lipase, LFT's in AM

## 2017-09-29 ENCOUNTER — INPATIENT (INPATIENT)
Facility: HOSPITAL | Age: 62
LOS: 0 days | Discharge: HOME | End: 2017-09-30
Attending: HOSPITALIST | Admitting: HOSPITALIST
Payer: COMMERCIAL

## 2017-09-29 VITALS
HEART RATE: 65 BPM | TEMPERATURE: 98 F | OXYGEN SATURATION: 100 % | SYSTOLIC BLOOD PRESSURE: 130 MMHG | WEIGHT: 220.02 LBS | RESPIRATION RATE: 19 BRPM | HEIGHT: 75 IN | DIASTOLIC BLOOD PRESSURE: 88 MMHG

## 2017-09-29 VITALS
RESPIRATION RATE: 18 BRPM | SYSTOLIC BLOOD PRESSURE: 138 MMHG | OXYGEN SATURATION: 100 % | DIASTOLIC BLOOD PRESSURE: 75 MMHG | HEART RATE: 87 BPM | TEMPERATURE: 97 F

## 2017-09-29 DIAGNOSIS — Z98.890 OTHER SPECIFIED POSTPROCEDURAL STATES: Chronic | ICD-10-CM

## 2017-09-29 DIAGNOSIS — K86.1 OTHER CHRONIC PANCREATITIS: ICD-10-CM

## 2017-09-29 DIAGNOSIS — I82.90 ACUTE EMBOLISM AND THROMBOSIS OF UNSPECIFIED VEIN: ICD-10-CM

## 2017-09-29 DIAGNOSIS — K21.9 GASTRO-ESOPHAGEAL REFLUX DISEASE WITHOUT ESOPHAGITIS: ICD-10-CM

## 2017-09-29 DIAGNOSIS — K76.89 OTHER SPECIFIED DISEASES OF LIVER: ICD-10-CM

## 2017-09-29 DIAGNOSIS — K85.20 ALCOHOL INDUCED ACUTE PANCREATITIS WITHOUT NECROSIS OR INFECTION: ICD-10-CM

## 2017-09-29 LAB
AMYLASE P1 CFR SERPL: 246 U/L — HIGH (ref 25–115)
ANION GAP SERPL CALC-SCNC: 8 MMOL/L — SIGNIFICANT CHANGE UP (ref 5–17)
BUN SERPL-MCNC: 13 MG/DL — SIGNIFICANT CHANGE UP (ref 7–23)
CALCIUM SERPL-MCNC: 9 MG/DL — SIGNIFICANT CHANGE UP (ref 8.5–10.1)
CHLORIDE SERPL-SCNC: 103 MMOL/L — SIGNIFICANT CHANGE UP (ref 96–108)
CO2 SERPL-SCNC: 28 MMOL/L — SIGNIFICANT CHANGE UP (ref 22–31)
CREAT SERPL-MCNC: 1.23 MG/DL — SIGNIFICANT CHANGE UP (ref 0.5–1.3)
GLUCOSE SERPL-MCNC: 96 MG/DL — SIGNIFICANT CHANGE UP (ref 70–99)
HCT VFR BLD CALC: 43.9 % — SIGNIFICANT CHANGE UP (ref 39–50)
HGB BLD-MCNC: 14.8 G/DL — SIGNIFICANT CHANGE UP (ref 13–17)
LIDOCAIN IGE QN: 1884 U/L — HIGH (ref 73–393)
MCHC RBC-ENTMCNC: 32.7 PG — SIGNIFICANT CHANGE UP (ref 27–34)
MCHC RBC-ENTMCNC: 33.7 GM/DL — SIGNIFICANT CHANGE UP (ref 32–36)
MCV RBC AUTO: 97 FL — SIGNIFICANT CHANGE UP (ref 80–100)
PLATELET # BLD AUTO: 284 K/UL — SIGNIFICANT CHANGE UP (ref 150–400)
POTASSIUM SERPL-MCNC: 3.3 MMOL/L — LOW (ref 3.5–5.3)
POTASSIUM SERPL-SCNC: 3.3 MMOL/L — LOW (ref 3.5–5.3)
RBC # BLD: 4.53 M/UL — SIGNIFICANT CHANGE UP (ref 4.2–5.8)
RBC # FLD: 11.3 % — SIGNIFICANT CHANGE UP (ref 11–15)
SODIUM SERPL-SCNC: 139 MMOL/L — SIGNIFICANT CHANGE UP (ref 135–145)
WBC # BLD: 5.9 K/UL — SIGNIFICANT CHANGE UP (ref 3.8–10.5)
WBC # FLD AUTO: 5.9 K/UL — SIGNIFICANT CHANGE UP (ref 3.8–10.5)

## 2017-09-29 RX ORDER — ELECTROLYTE SOLUTION,INJ
1 VIAL (ML) INTRAVENOUS
Qty: 0 | Refills: 0 | Status: DISCONTINUED | OUTPATIENT
Start: 2017-09-29 | End: 2017-09-29

## 2017-09-29 RX ORDER — HYDROMORPHONE HYDROCHLORIDE 2 MG/ML
0.5 INJECTION INTRAMUSCULAR; INTRAVENOUS; SUBCUTANEOUS EVERY 4 HOURS
Qty: 0 | Refills: 0 | Status: DISCONTINUED | OUTPATIENT
Start: 2017-09-29 | End: 2017-09-29

## 2017-09-29 RX ORDER — DOCUSATE SODIUM 100 MG
1 CAPSULE ORAL
Qty: 0 | Refills: 0 | COMMUNITY
Start: 2017-09-29

## 2017-09-29 RX ORDER — ACETAMINOPHEN 500 MG
650 TABLET ORAL EVERY 6 HOURS
Qty: 0 | Refills: 0 | Status: DISCONTINUED | OUTPATIENT
Start: 2017-09-29 | End: 2017-09-30

## 2017-09-29 RX ORDER — SENNA PLUS 8.6 MG/1
2 TABLET ORAL AT BEDTIME
Qty: 0 | Refills: 0 | Status: DISCONTINUED | OUTPATIENT
Start: 2017-09-29 | End: 2017-09-30

## 2017-09-29 RX ORDER — ELECTROLYTE SOLUTION,INJ
1 VIAL (ML) INTRAVENOUS
Qty: 0 | Refills: 0 | COMMUNITY
Start: 2017-09-29

## 2017-09-29 RX ORDER — PANTOPRAZOLE SODIUM 20 MG/1
40 TABLET, DELAYED RELEASE ORAL
Qty: 0 | Refills: 0 | COMMUNITY
Start: 2017-09-29

## 2017-09-29 RX ORDER — SODIUM CHLORIDE 9 MG/ML
1000 INJECTION, SOLUTION INTRAVENOUS
Qty: 0 | Refills: 0 | Status: DISCONTINUED | OUTPATIENT
Start: 2017-09-29 | End: 2017-09-30

## 2017-09-29 RX ORDER — PANTOPRAZOLE SODIUM 20 MG/1
40 TABLET, DELAYED RELEASE ORAL
Qty: 0 | Refills: 0 | Status: DISCONTINUED | OUTPATIENT
Start: 2017-09-29 | End: 2017-09-30

## 2017-09-29 RX ORDER — INFLUENZA VIRUS VACCINE 15; 15; 15; 15 UG/.5ML; UG/.5ML; UG/.5ML; UG/.5ML
0.5 SUSPENSION INTRAMUSCULAR ONCE
Qty: 0 | Refills: 0 | Status: DISCONTINUED | OUTPATIENT
Start: 2017-09-29 | End: 2017-09-30

## 2017-09-29 RX ORDER — HYDROMORPHONE HYDROCHLORIDE 2 MG/ML
0.5 INJECTION INTRAMUSCULAR; INTRAVENOUS; SUBCUTANEOUS EVERY 4 HOURS
Qty: 0 | Refills: 0 | Status: DISCONTINUED | OUTPATIENT
Start: 2017-09-29 | End: 2017-09-30

## 2017-09-29 RX ORDER — SENNA PLUS 8.6 MG/1
2 TABLET ORAL
Qty: 0 | Refills: 0 | COMMUNITY
Start: 2017-09-29

## 2017-09-29 RX ORDER — POTASSIUM CHLORIDE 20 MEQ
40 PACKET (EA) ORAL ONCE
Qty: 0 | Refills: 0 | Status: COMPLETED | OUTPATIENT
Start: 2017-09-29 | End: 2017-09-29

## 2017-09-29 RX ORDER — DOCUSATE SODIUM 100 MG
100 CAPSULE ORAL
Qty: 0 | Refills: 0 | Status: DISCONTINUED | OUTPATIENT
Start: 2017-09-29 | End: 2017-09-30

## 2017-09-29 RX ADMIN — Medication 40 MILLIEQUIVALENT(S): at 16:44

## 2017-09-29 RX ADMIN — Medication 100 MILLIGRAM(S): at 06:33

## 2017-09-29 RX ADMIN — SODIUM CHLORIDE 125 MILLILITER(S): 9 INJECTION, SOLUTION INTRAVENOUS at 22:12

## 2017-09-29 RX ADMIN — PANTOPRAZOLE SODIUM 40 MILLIGRAM(S): 20 TABLET, DELAYED RELEASE ORAL at 12:31

## 2017-09-29 RX ADMIN — Medication 100 MILLIGRAM(S): at 12:35

## 2017-09-29 NOTE — H&P ADULT - NSHPREVIEWOFSYSTEMS_GEN_ALL_CORE
REVIEW OF SYSTEMS:    CONSTITUTIONAL: No weakness, fevers or chills  EYES/ENT: No visual changes;  no throat pain   NECK: No pain or stiffness  RESPIRATORY: No cough, wheezing, hemoptysis; No shortness of breath  CARDIOVASCULAR: No chest pain or palpitations  GASTROINTESTINAL: (+) epigastric pain but now significantly improved. No nausea, vomiting, or hematemesis; No diarrhea or constipation. No melena or hematochezia.  GENITOURINARY: No dysuria, change in frequency or hematuria  NEUROLOGICAL: No numbness or weakness  SKIN: No itching, burning, rashes, or lesions   All other review of systems is negative unless indicated above. REVIEW OF SYSTEMS:    CONSTITUTIONAL: No weakness, fevers or chills, no weight loss or night sweats  EYES/ENT: No visual changes;  no throat pain   NECK: No pain or stiffness  RESPIRATORY: No cough, wheezing, hemoptysis; No shortness of breath  CARDIOVASCULAR: No chest pain or palpitations  GASTROINTESTINAL: (+) epigastric pain now resolved. No nausea, vomiting, or hematemesis; No diarrhea or constipation. No melena or hematochezia.  GENITOURINARY: No dysuria, change in frequency or hematuria  NEUROLOGICAL: No numbness or weakness  SKIN: No itching, burning, rashes, or lesions   All other review of systems is negative unless indicated above.

## 2017-09-29 NOTE — ACUTE INTERFACILITY TRANSFER NOTE - PLAN OF CARE
no pancreatitis transfer to [facility] under dr MCKENZIE  and consult dr valladares

## 2017-09-29 NOTE — ACUTE INTERFACILITY TRANSFER NOTE - HOSPITAL COURSE
Pt. is a 63 y/o w/o significant pmhx except mild gerd, was in usoh till 2 days ago started to develop r-sided and epigastric pain exacerbated by food.  sx's were getting worse, pt denies such sx's in past.  Does report hx of gerd but those sx's very different and usually very mild states now was also getting pain radiating to r back at times which made him seek care.  In ed has lipase of 1015.   Pt denies  any fever, chills, sob, cp palpitations n/v/d/c. no sick contacts or travels. pt does report etoh ~2 beers qd (32oz) states occasionally more, but never drunk. (16 Sep 2017 04:00)  patient had a prolonged course of acute pancreatitis and had ct/ mri confirming pancreatits . he was placed on prolonged npo and PPN and his lfts remained very elevated . he stopped having painso we gave him clear liquids despite the elevated lipase and after two days he had pain again and therefore he is being transferred to a tertiary center to possibly have an EUS

## 2017-09-29 NOTE — H&P ADULT - ASSESSMENT
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

## 2017-09-29 NOTE — H&P ADULT - HISTORY OF PRESENT ILLNESS
62M h/o mild GERD, admitted to  on 9/15 for pancreatitis w/ negative w/u and transferred to [facility] for possible EUS by Dr. Montague.   Initially developed epigastric abn pain three days before presenting to , radiating to back, exacerbated by eating. Crampy pain, different than his GERD. Has GERD associated pain when eating late at night and going to bed, relieved by TUMS. Pt initially and throughout admission had no other associated symptoms: fever, chills, sob, cp, palpitations n/v/d/c. no sick contacts. Initial lipase in  ED 1015 although initial CT A/P on 9/16 w/o evidence of pancreatitis. Treated conservatively with fluids, pain control and advanced diet to clear liquids, w/ additional supplemental TPN w/ amino acids, dextrose, electrolytes and multivitamins. Lipase initially dec to 800's but then trended up to 2000, w/ inc T bili, mild transanimitis, prompting further w/u. MRI abn and MRCP showing pancreatitis at site of pancreatic head, no biliary stones or CBD dilation. Autoimmune w/u performed with smooth muscle ab and microsomal antibody neg. Hep panel neg. Transferred to [facility] for potential EUS.     Currently, abn pain has resolved. Denies any other associated symptoms. Vitals WNL. Pt and wife report that since retiring about 5 months ago he has been drinking more. About 5-6 beers a day. He also returned from 5-week trip to [place] about  1.5 months ago where he was drinking 10+ beers a day with his friends. Smokes 1-2 cigarretes a day. No IVDU. Last lipid profile 10/2016 unremarkable. No previous hospitalizations. 62M h/o mild GERD, admitted to  on 9/15 for pancreatitis w/ negative w/u and transferred to [facility] for possible EUS by Dr. Montague.   Initially developed epigastric abn pain three days before presenting to , radiating to back, exacerbated by eating. Crampy pain, different than his GERD. Has GERD associated pain when eating late at night and going to bed, relieved by TUMS. Pt initially and throughout admission had no other associated symptoms: fever, chills, sob, cp, palpitations n/v/d/c. no sick contacts. Initial lipase in  ED 1015 although initial CT A/P on 9/16 w/o evidence of pancreatitis. Treated conservatively with fluids, pain control and advanced diet to clear liquids, w/ additional supplemental TPN w/ amino acids, dextrose, electrolytes and multivitamins. Lipase initially dec to 800's but then trended up to 2000, w/ inc T bili, mild transanimitis, prompting further w/u. MRI abn and MRCP showing pancreatitis at site of pancreatic head, no biliary stones or CBD dilation. HIDA negative. Autoimmune w/u performed with smooth muscle ab and microsomal antibody neg. Hep panel neg. Transferred to [facility] for potential EUS.     Currently, abn pain has resolved. Denies any other associated symptoms. Vitals WNL. Pt and wife report that since retiring about 5 months ago he has been drinking more. About 5-6 beers a day. He also returned from 5-week trip to [facility] about  1.5 months ago where he was drinking 10+ beers a day with his friends. Smokes 1-2 cigarretes a day. No IVDU. Last lipid profile 10/2016 unremarkable. No previous hospitalizations. 62M h/o mild GERD, admitted to  on 9/15 for pancreatitis w/ negative w/u and transferred to [facility] for possible EUS by Dr. Montague.   Initially developed epigastric abn pain three days before presenting to , radiating to back, exacerbated by eating. Crampy pain, different than his GERD. Has GERD associated pain when eating late at night and going to bed, relieved by TUMS. Pt initially and throughout admission had no other associated symptoms: fever, chills, sob, cp, palpitations n/v/d/c. no sick contacts. Initial lipase in [facility] ED 1015 although initial CT A/P on 9/16 w/o evidence of pancreatitis. Treated conservatively with fluids, pain control and advanced diet to clear liquids, w/ additional supplemental TPN w/ amino acids, dextrose, electrolytes and multivitamins. Lipase initially dec to 800's but then trended up to 2000, w/ inc T bili, mild transaminitis, prompting further w/u. MRI abn and MRCP showing pancreatitis at site of pancreatic head, no biliary stones or CBD dilation. HIDA negative. Autoimmune w/u performed with smooth muscle ab and microsomal antibody neg, normal IgG 4. Hep panel neg. Transferred to [facility] for potential EUS.     Currently, abn pain has resolved, was tolerating clears at , reports he is hungry. Denies any other associated symptoms. Vitals WNL. Pt and wife report that since retiring about 5 months ago he has been drinking more. About 5-6 beers a day. He also returned from 5-week trip to [place] about  1.5 months ago where he was drinking 10+ beers a day with his friends. Smokes 1-2 cigarettes a day. No IVDU. Last lipid profile 10/2016 unremarkable. No previous hospitalizations.

## 2017-09-29 NOTE — H&P ADULT - PROBLEM SELECTOR PLAN 1
-clinical history of increased ETOH intake and binge drinking makes ETOH induced pancreatitis more likely, especially in setting of unremarkable imaging at [facility], however lipase and LFTs have remained persistently high in setting of signficant clinical improvement and resolution of pain. No evidence of pancreatic necrosis at this point.  -c/w supportive care: IVF w/ LR, ADAT, tylenol for mild pain and oxycodone for moderate-severe pain prn   -check lipid profile, HIV   -d/w GI in am- Dr. Montague is aware of transfer, potential EUS pending  -repeat lipase in am -clinical history of increased ETOH intake and binge drinking makes ETOH induced pancreatitis more likely, especially in setting of unremarkable imaging at [facility], however lipase and LFTs have remained persistently high in setting of signficant clinical improvement and resolution of pain. No evidence of pancreatic necrosis at this point.  -c/w supportive care: IVF w/ LR, ADAT, tylenol for mild pain and dilaudid for moderate-severe pain prn   -check lipid profile, HIV   -d/w GI in am- Dr. Montague is aware of transfer, potential EUS pending  -repeat lipase in am  -pt low risk for DVT, improve score 1: SCDs for ppx -clinical history of increased ETOH intake and binge drinking makes ETOH induced pancreatitis more likely, especially in setting of unremarkable imaging at [facility], however lipase and LFTs have remained persistently high in setting of significant clinical improvement and resolution of pain. No evidence of pancreatic necrosis at this point.  -c/w supportive care: IVF w/ LR, ADAT, tylenol for mild pain and dilaudid for moderate-severe pain prn   -check lipid profile, HIV   -d/w GI in am- Dr. Montague is aware of transfer, potential EUS pending  -repeat lipase in am  -pt low risk for DVT, improve score 1: SCDs for ppx, encourage ambualtion

## 2017-09-29 NOTE — H&P ADULT - NSHPLABSRESULTS_GEN_ALL_CORE
14.8   5.9   )-----------( 284      ( 29 Sep 2017 07:03 )             43.9       09-29    139  |  103  |  13  ----------------------------<  96  3.3<L>   |  28  |  1.23    Ca    9.0      29 Sep 2017 07:03  Phos  3.5     09-28  Mg     2.1     09-28    TPro  7.7  /  Alb  3.3  /  TBili  2.4<H>  /  DBili  .49<H>  /  AST  93<H>  /  ALT  158<H>  /  AlkPhos  370<H>  09-28          Cultures: no cultures sent     Radiology: imaging at [facility] review   < from: MRI Abdomen w/o Cont (09.25.17 @ 11:39) >      FINDINGS:    There are no focal hepatic lesions identified.  No intra or extrahepatic   biliary dilatation is noted. The CBDmeasures 3 mm. No intraductal   biliary calculi are identified. The gallbladder is visualized without   stones.    There is mild fluid around the pancreatic head. The pancreas demonstrates   mildly heterogeneous T1 signal. The pancreatic duct is normal in caliber.    The spleen, adrenal glands, and kidneys are unremarkable in appearance.    There is no retroperitoneal adenopathy. There is no ascites.    IMPRESSION:    No cholelithiasis, choledocholithiasis, or biliary ductal dilatation.  Mild pancreatitis.      < end of copied text >    EKG:  no prior EKG 14.8   5.9   )-----------( 284      ( 29 Sep 2017 07:03 )             43.9     09-29    139  |  103  |  13  ----------------------------<  96  3.3<L>   |  28  |  1.23    Ca    9.0      29 Sep 2017 07:03  Phos  3.5     09-28  Mg     2.1     09-28    TPro  7.7  /  Alb  3.3  /  TBili  2.4<H>  /  DBili  .49<H>  /  AST  93<H>  /  ALT  158<H>  /  AlkPhos  370<H>  09-28        Radiology: imaging at  review   < from: MRI Abdomen w/o Cont (09.25.17 @ 11:39) >  FINDINGS: There are no focal hepatic lesions identified.  No intra or extrahepatic biliary dilatation is noted. The CBD measures 3 mm. No intraductal biliary calculi are identified. The gallbladder is visualized without stones. There is mild fluid around the pancreatic head. The pancreas demonstrates mildly heterogeneous T1 signal. The pancreatic duct is normal in caliber. The spleen, adrenal glands, and kidneys are unremarkable in appearance. There is no retroperitoneal adenopathy. There is no ascites.   IMPRESSION: No cholelithiasis, choledocholithiasis, or biliary ductal dilatation. Mild pancreatitis.   < end of copied text >    EKG:  no prior EKG

## 2017-09-29 NOTE — PROGRESS NOTE ADULT - PROBLEM SELECTOR PLAN 1
no pain iv hydration  change diet to clear    lipase decreasing   currently patient on PPN. will continue to monitor..   continue with PRN pain meds  second gi consult noted     pt counseled on etoh cessation and pt agrees  Abdominal US noted   ct scan abd noted   mri abd noted    for ERCP once resolved   GI note [place] covering was reviewed

## 2017-09-29 NOTE — H&P ADULT - NSHPPHYSICALEXAM_GEN_ALL_CORE
Vital Signs Last 24 Hrs  T(C): 36.6 (09-29-17 @ 20:35), Max: 36.8 (09-29-17 @ 17:40)  T(F): 97.9 (09-29-17 @ 20:35), Max: 98.2 (09-29-17 @ 17:40)  HR: 74 (09-29-17 @ 20:35) (65 - 87)  BP: 119/84 (09-29-17 @ 20:35) (110/65 - 138/75)  BP(mean): --  RR: 18 (09-29-17 @ 20:35) (15 - 19)  SpO2: 100% (09-29-17 @ 20:35) (97% - 100%)    GENERAL: NAD  HEENT: EOMI, MMM, no oropharyngeal lesions or erythema appreciated  Pulm: normal work of breathing, CTABL  CV: RRR, S1&S2+, systolic murmur grade I   ABDOMEN: soft, nt, nd, no hepatosplenomegaly  MSK: nl ROM  EXTREMITIES:  no appreciable edema in b/l LE  Neuro: A&Ox3, no focal deficits  SKIN: warm and dry, no visible rash

## 2017-09-29 NOTE — PROGRESS NOTE ADULT - PROBLEM SELECTOR PROBLEM 2
Gastroesophageal reflux disease without esophagitis
LFTs abnormal
Gastroesophageal reflux disease without esophagitis

## 2017-09-29 NOTE — H&P ADULT - ATTENDING COMMENTS
Agree with H&P and plan above as edited.    Briefly, 62 -year-old male with GERD presenting with presumed alcohol induced pancreatitis to  9/16, had complete workup. Charts reviewed from , patient admitted with mild pancreatitis identified on MRI abdomen, normal CT abdomen, normal HIDA, unremarkable autoimmune pancreatitis labs, per patient abdominal pain resolved 1 week ago, tolerating clear diet, reports desire to eat regular diet, no nausea/vomiting, transferred to [facility] for EUS for further eval of persistently elevated lipase.  HIV and lipid panel ordered for AM for further eval.  Will hold TPN, as unclear need, if OK with GI will start on full liquid diet tomorrow and advance to regular and make NPO for EUS when scheduled.  Will need to touch base with GI in AM.

## 2017-09-29 NOTE — H&P ADULT - PROBLEM SELECTOR PLAN 2
-c/w daily protonix -increased LFTs in setting of pancreatitis w/ negative w/u as mentioned above  -appreciate GI input, c/w trending -transaminitis with hyperbilirubinemia and elevated alk phos in setting of pancreatitis w/ negative w/u as mentioned above  -appreciate GI input, c/w trending

## 2017-09-29 NOTE — PROGRESS NOTE ADULT - ASSESSMENT
pt w/hx of gerd w/pancreatitis
pt w/hx of gerd w/pancreatitis
HPI:  Pt. is a 61 y/o w/o significant pmhx except mild gerd, was in usoh till 2 days ago started to develop r-sided and epigastric pain exacerbated by food.  sx's were getting worse, pt denies such sx's in past.  Does report hx of gerd but those sx's very different and usually very mild states now was also getting pain radiating to r back at times which made him seek care.  In ed has lipase of 1015.   Pt denies  any fever, chills, sob, cp palpitations n/v/d/c. no sick contacts or travels. pt does report etoh ~2 beers qd (32oz) states occasionally more, but never drunk. (16 Sep 2017 04:00)  --- 1) Lipase relatively unchanged. 2) LFTS slightly higher. Patient asymptomatic  1) elevated Lipase - NPO, PPN, repeat MRCP Monday, repeat labs  2) elevated LFTs - r/o secondary to PPN ( will decrease to 63), CBD stones - decrease PPN, MRCP, f/u labs
HPI:  Pt. is a 63 y/o w/o significant pmhx except mild gerd, was in usoh till 2 days ago started to develop r-sided and epigastric pain exacerbated by food.  sx's were getting worse, pt denies such sx's in past.  Does report hx of gerd but those sx's very different and usually very mild states now was also getting pain radiating to r back at times which made him seek care.  In ed has lipase of 1015.   Pt denies  any fever, chills, sob, cp palpitations n/v/d/c. no sick contacts or travels. pt does report etoh ~2 beers qd (32oz) states occasionally more, but never drunk. (16 Sep 2017 04:00)  --- 1) Lipase relatively unchanged. 2) LFTS slightly higher. Patient asymptomatic  1) elevated Lipase - NPO, PPN, repeat MRCP Monday, repeat labs  2) elevated LFTs - r/o secondary to PPN (decreased to 63), CBD stones -  MRCP, f/u labs
IDIOPATHIC PANCREATITIS , ELEVATED LFTS
pt w/hx of gerd w/pancreatitis

## 2017-09-29 NOTE — PROGRESS NOTE ADULT - SUBJECTIVE AND OBJECTIVE BOX
CHIEF COMPLAINT/INTERVAL HISTORY:    Patient is a 62y old  Male who presents with a chief complaint of abdominal pain (16 Sep 2017 04:00)      HPI:  Pt. is a 61 y/o w/o significant pmhx except mild gerd, was in usoh till 2 days ago started to develop r-sided and epigastric pain exacerbated by food.  sx's were getting worse, pt denies such sx's in past.  Does report hx of gerd but those sx's very different and usually very mild states now was also getting pain radiating to r back at times which made him seek care.  In ed has lipase of 1015.   Pt denies  any fever, chills, sob, cp palpitations n/v/d/c. no sick contacts or travels. pt does report etoh ~2 beers qd (32oz) states occasionally more, but never drunk. (16 Sep 2017 04:00)    Overnight issues  had left sided abdominal; pain radiating to the back   SUBJECTIVE & OBJECTIVE: Pt seen and examined at bedside.   ROS:  CONSTITUTIONAL: No fever, weight loss, or fatigue  NECK: No pain or stiffness  RESPIRATORY: No cough, wheezing, chills or hemoptysis; No shortness of breath  CARDIOVASCULAR: No chest pain, palpitations, dizziness, or leg swelling  GASTROINTESTINAL: mild epigastric pain. No nausea, vomiting, or hematemesis; No diarrhea or constipation. No melena or hematochezia.  GENITOURINARY: No dysuria, frequency, hematuria, or incontinence  NEUROLOGICAL: No headaches, memory loss, loss of strength, numbness, or tremors  SKIN: No itching, burning, rashes, or lesions   ICU Vital Signs Last 24 Hrs  T(C): 36.7 (29 Sep 2017 05:56), Max: 36.7 (29 Sep 2017 05:56)  T(F): 98 (29 Sep 2017 05:56), Max: 98 (29 Sep 2017 05:56)  HR: 72 (29 Sep 2017 05:56) (69 - 80)  BP: 110/65 (29 Sep 2017 05:56) (110/65 - 129/79)  BP(mean): --  ABP: --  ABP(mean): --  RR: 15 (29 Sep 2017 05:56) (15 - 16)  SpO2: 98% (29 Sep 2017 05:56) (96% - 98%)        MEDICATIONS  (STANDING):  pantoprazole  Injectable 40 milliGRAM(s) IV Push daily  senna 2 Tablet(s) Oral at bedtime  docusate sodium 100 milliGRAM(s) Oral three times a day  Parenteral Nutrition - Adult 1 Each (63 mL/Hr) TPN Continuous <Continuous>    MEDICATIONS  (PRN):        PHYSICAL EXAM:    GENERAL: NAD, well-groomed, well-developed  HEAD:  Atraumatic, Normocephalic  EYES: EOMI, PERRLA, conjunctiva and sclera clear  ENMT: Moist mucous membranes  NECK: Supple, No JVD  NERVOUS SYSTEM:  Alert & Oriented X3, Motor Strength 5/5 B/L upper and lower extremities; DTRs 2+ intact and symmetric  CHEST/LUNG: Clear to auscultation bilaterally; No rales, rhonchi, wheezing, or rubs  HEART: Regular rate and rhythm; No murmurs, rubs, or gallops  ABDOMEN: Soft, Nontender, Nondistended; Bowel sounds present  EXTREMITIES:  2+ Peripheral Pulses, No clubbing, cyanosis, or edema    LABS:                        14.8   5.9   )-----------( 284      ( 29 Sep 2017 07:03 )             43.9     09-29    139  |  103  |  13  ----------------------------<  96  3.3<L>   |  28  |  1.23    Ca    9.0      29 Sep 2017 07:03  Phos  3.5     09-28  Mg     2.1     09-28    TPro  7.7  /  Alb  3.3  /  TBili  2.4<H>  /  DBili  .49<H>  /  AST  93<H>  /  ALT  158<H>  /  AlkPhos  370<H>  09-28          CAPILLARY BLOOD GLUCOSE          RECENT CULTURES:      RADIOLOGY & ADDITIONAL TESTS:  Imaging Personally Reviewed:  [ ] YES      Consultant(s) Notes Reviewed:  [ ] YES     Care Discussed with [ ] Consultants [X ] Patient [ ] Family  [x ]    [x ]  Other; RN  HEALTH ISSUES - PROBLEM Dx:  LFTs abnormal: LFTs abnormal  Preventive measure: Preventive measure  Gastroesophageal reflux disease without esophagitis: Gastroesophageal reflux disease without esophagitis  Acute pancreatitis, unspecified complication status, unspecified pancreatitis type: Acute pancreatitis, unspecified complication status, unspecified pancreatitis type        DVT/GI ppx  Discussed with pt @ bedside

## 2017-09-29 NOTE — PROGRESS NOTE ADULT - SUBJECTIVE AND OBJECTIVE BOX
Pt stable - had some pain last night - on full fluids      MEDICATIONS  (STANDING):  pantoprazole  Injectable 40 milliGRAM(s) IV Push daily  senna 2 Tablet(s) Oral at bedtime  docusate sodium 100 milliGRAM(s) Oral three times a day  Parenteral Nutrition - Adult 1 Each (63 mL/Hr) TPN Continuous <Continuous>  Parenteral Nutrition - Adult 1 Each (63 mL/Hr) TPN Continuous <Continuous>    MEDICATIONS  (PRN):      Allergies    dust (Sneezing)  No Known Drug Allergies    Intolerances        Vital Signs Last 24 Hrs  T(C): 36.6 (29 Sep 2017 11:12), Max: 36.7 (29 Sep 2017 05:56)  T(F): 97.8 (29 Sep 2017 11:12), Max: 98 (29 Sep 2017 05:56)  HR: 74 (29 Sep 2017 11:12) (69 - 75)  BP: 124/80 (29 Sep 2017 11:12) (110/65 - 129/79)  BP(mean): --  RR: 16 (29 Sep 2017 11:12) (15 - 16)  SpO2: 98% (29 Sep 2017 11:12) (96% - 98%)    PHYSICAL EXAM:  General: NAD.  CVS: S1, S2  Chest: air entry bilaterally present  Abd: BS present, soft, non-tender      LABS:                        14.8   5.9   )-----------( 284      ( 29 Sep 2017 07:03 )             43.9     09-29    139  |  103  |  13  ----------------------------<  96  3.3<L>   |  28  |  1.23    Ca    9.0      29 Sep 2017 07:03  Phos  3.5     09-28  Mg     2.1     09-28    TPro  7.7  /  Alb  3.3  /  TBili  2.4<H>  /  DBili  .49<H>  /  AST  93<H>  /  ALT  158<H>  /  AlkPhos  370<H>  09-28      Lipase, Serum: 1884 U/L (09.29.17 @ 07:03)    Case discussed with Dr Rajan - pt not improving; still getting pain, lipase elevated  Spoke with Dr Montague at [facility] - will arrange transfer to [facility] for EUS and further imaging as per Dr Montague

## 2017-09-29 NOTE — PROGRESS NOTE ADULT - PROVIDER SPECIALTY LIST ADULT
Gastroenterology
Hospitalist
Gastroenterology
Hospitalist
Hospitalist

## 2017-09-29 NOTE — PROGRESS NOTE ADULT - PROBLEM SELECTOR PROBLEM 3
Preventive measure

## 2017-09-29 NOTE — PROGRESS NOTE ADULT - PROBLEM SELECTOR PLAN 3
Pt very active, low risk

## 2017-09-29 NOTE — PROGRESS NOTE ADULT - PROBLEM SELECTOR PROBLEM 1
Acute pancreatitis, unspecified complication status, unspecified pancreatitis type

## 2017-09-29 NOTE — H&P ADULT - PROBLEM SELECTOR PROBLEM 3
Prophylactic use of unfractionated heparin for venous thromboembolism Gastroesophageal reflux disease without esophagitis

## 2017-09-29 NOTE — ACUTE INTERFACILITY TRANSFER NOTE - CARE PROVIDER_API CALL
Rocael Montague), Gastroenterology; Internal Medicine  [address] [place] [address]  Phone: [phone]  Fax: [phone]

## 2017-09-30 ENCOUNTER — TRANSCRIPTION ENCOUNTER (OUTPATIENT)
Age: 62
End: 2017-09-30

## 2017-09-30 VITALS
RESPIRATION RATE: 18 BRPM | HEART RATE: 68 BPM | OXYGEN SATURATION: 100 % | DIASTOLIC BLOOD PRESSURE: 65 MMHG | TEMPERATURE: 98 F | SYSTOLIC BLOOD PRESSURE: 108 MMHG

## 2017-09-30 PROBLEM — K21.9 GASTRO-ESOPHAGEAL REFLUX DISEASE WITHOUT ESOPHAGITIS: Chronic | Status: ACTIVE | Noted: 2017-09-15

## 2017-09-30 PROBLEM — J93.9 PNEUMOTHORAX, UNSPECIFIED: Chronic | Status: ACTIVE | Noted: 2017-09-15

## 2017-09-30 LAB
ALBUMIN SERPL ELPH-MCNC: 3.8 G/DL — SIGNIFICANT CHANGE UP (ref 3.3–5)
ALP SERPL-CCNC: 316 U/L — HIGH (ref 40–120)
ALT FLD-CCNC: 118 U/L — HIGH (ref 4–41)
AST SERPL-CCNC: 70 U/L — HIGH (ref 4–40)
BASOPHILS # BLD AUTO: 0.04 K/UL — SIGNIFICANT CHANGE UP (ref 0–0.2)
BASOPHILS NFR BLD AUTO: 0.8 % — SIGNIFICANT CHANGE UP (ref 0–2)
BILIRUB SERPL-MCNC: 1.7 MG/DL — HIGH (ref 0.2–1.2)
BUN SERPL-MCNC: 11 MG/DL — SIGNIFICANT CHANGE UP (ref 7–23)
CALCIUM SERPL-MCNC: 9.6 MG/DL — SIGNIFICANT CHANGE UP (ref 8.4–10.5)
CHLORIDE SERPL-SCNC: 102 MMOL/L — SIGNIFICANT CHANGE UP (ref 98–107)
CHOLEST SERPL-MCNC: 149 MG/DL — SIGNIFICANT CHANGE UP (ref 120–199)
CO2 SERPL-SCNC: 25 MMOL/L — SIGNIFICANT CHANGE UP (ref 22–31)
CREAT SERPL-MCNC: 1.18 MG/DL — SIGNIFICANT CHANGE UP (ref 0.5–1.3)
EOSINOPHIL # BLD AUTO: 0.12 K/UL — SIGNIFICANT CHANGE UP (ref 0–0.5)
EOSINOPHIL NFR BLD AUTO: 2.3 % — SIGNIFICANT CHANGE UP (ref 0–6)
GLUCOSE SERPL-MCNC: 89 MG/DL — SIGNIFICANT CHANGE UP (ref 70–99)
HCT VFR BLD CALC: 43.3 % — SIGNIFICANT CHANGE UP (ref 39–50)
HDLC SERPL-MCNC: 31 MG/DL — LOW (ref 35–55)
HGB BLD-MCNC: 14.1 G/DL — SIGNIFICANT CHANGE UP (ref 13–17)
IMM GRANULOCYTES # BLD AUTO: 0.01 # — SIGNIFICANT CHANGE UP
IMM GRANULOCYTES NFR BLD AUTO: 0.2 % — SIGNIFICANT CHANGE UP (ref 0–1.5)
LIDOCAIN IGE QN: 424.1 U/L — HIGH (ref 7–60)
LIPID PNL WITH DIRECT LDL SERPL: 108 MG/DL — SIGNIFICANT CHANGE UP
LYMPHOCYTES # BLD AUTO: 1.4 K/UL — SIGNIFICANT CHANGE UP (ref 1–3.3)
LYMPHOCYTES # BLD AUTO: 26.9 % — SIGNIFICANT CHANGE UP (ref 13–44)
MAGNESIUM SERPL-MCNC: 1.7 MG/DL — SIGNIFICANT CHANGE UP (ref 1.6–2.6)
MCHC RBC-ENTMCNC: 31.4 PG — SIGNIFICANT CHANGE UP (ref 27–34)
MCHC RBC-ENTMCNC: 32.6 % — SIGNIFICANT CHANGE UP (ref 32–36)
MCV RBC AUTO: 96.4 FL — SIGNIFICANT CHANGE UP (ref 80–100)
MONOCYTES # BLD AUTO: 0.66 K/UL — SIGNIFICANT CHANGE UP (ref 0–0.9)
MONOCYTES NFR BLD AUTO: 12.7 % — SIGNIFICANT CHANGE UP (ref 2–14)
NEUTROPHILS # BLD AUTO: 2.98 K/UL — SIGNIFICANT CHANGE UP (ref 1.8–7.4)
NEUTROPHILS NFR BLD AUTO: 57.1 % — SIGNIFICANT CHANGE UP (ref 43–77)
NRBC # FLD: 0 — SIGNIFICANT CHANGE UP
PHOSPHATE SERPL-MCNC: 3.6 MG/DL — SIGNIFICANT CHANGE UP (ref 2.5–4.5)
PLATELET # BLD AUTO: 285 K/UL — SIGNIFICANT CHANGE UP (ref 150–400)
PMV BLD: 10.7 FL — SIGNIFICANT CHANGE UP (ref 7–13)
POTASSIUM SERPL-MCNC: 4.1 MMOL/L — SIGNIFICANT CHANGE UP (ref 3.5–5.3)
POTASSIUM SERPL-SCNC: 4.1 MMOL/L — SIGNIFICANT CHANGE UP (ref 3.5–5.3)
PROT SERPL-MCNC: 7.1 G/DL — SIGNIFICANT CHANGE UP (ref 6–8.3)
RBC # BLD: 4.49 M/UL — SIGNIFICANT CHANGE UP (ref 4.2–5.8)
RBC # FLD: 11.8 % — SIGNIFICANT CHANGE UP (ref 10.3–14.5)
SODIUM SERPL-SCNC: 140 MMOL/L — SIGNIFICANT CHANGE UP (ref 135–145)
TRIGL SERPL-MCNC: 84 MG/DL — SIGNIFICANT CHANGE UP (ref 10–149)
WBC # BLD: 5.21 K/UL — SIGNIFICANT CHANGE UP (ref 3.8–10.5)
WBC # FLD AUTO: 5.21 K/UL — SIGNIFICANT CHANGE UP (ref 3.8–10.5)

## 2017-09-30 PROCEDURE — 99222 1ST HOSP IP/OBS MODERATE 55: CPT | Mod: GC

## 2017-09-30 PROCEDURE — 99223 1ST HOSP IP/OBS HIGH 75: CPT | Mod: GC

## 2017-09-30 PROCEDURE — 99239 HOSP IP/OBS DSCHRG MGMT >30: CPT

## 2017-09-30 RX ORDER — PANTOPRAZOLE SODIUM 20 MG/1
1 TABLET, DELAYED RELEASE ORAL
Qty: 30 | Refills: 0 | OUTPATIENT
Start: 2017-09-30 | End: 2017-10-30

## 2017-09-30 RX ADMIN — PANTOPRAZOLE SODIUM 40 MILLIGRAM(S): 20 TABLET, DELAYED RELEASE ORAL at 07:14

## 2017-09-30 NOTE — CONSULT NOTE ADULT - SUBJECTIVE AND OBJECTIVE BOX
Chief Complaint:  Patient is a 62y old  Male who presents with a chief complaint of transferred from  for EUS (29 Sep 2017 19:41)      HPI:  63 yo M with GERD admitted to outside hospital on 9/15 for pancreatitis now transferred to [facility] for EUS by Dr. Montague.    Patient presented to OSH with epigastric abd pain radiating to back. Lipase foudn to be 1015 and CTAP without evidence of pancreatitis. He was treated with IV fluids and pian control. Attempted to advance his diet to clear liquids but he required TPN for additional supplementation. Lipase initially downtrended to 800s but then uptrended to 2000 with associated increased in T bili and AST ALT. MRI and MRCP showed pancreatitis at the pancreatic head but no biliary stones or CBD dilation. HIDA was negative. Autoimmune workup was negative (anti smooth, microsomal antibody IgG4. Hep panel negative. He was then transferred here for EUS.    Allergies:  dust (Sneezing)  No Known Drug Allergies      Home Medications:    Hospital Medications:  influenza   Vaccine 0.5 milliLiter(s) IntraMuscular once  lactated ringers. 1000 milliLiter(s) IV Continuous <Continuous>  pantoprazole    Tablet 40 milliGRAM(s) Oral before breakfast  senna 2 Tablet(s) Oral at bedtime  docusate sodium 100 milliGRAM(s) Oral two times a day PRN  acetaminophen   Tablet. 650 milliGRAM(s) Oral every 6 hours PRN  HYDROmorphone  Injectable 0.5 milliGRAM(s) IV Push every 4 hours PRN      PMHX/PSHX:  Pneumothorax on right  GERD (gastroesophageal reflux disease)  No pertinent past medical history  H/O chest tube placement  History of hernia repair      Family history:  No pertinent family history in first degree relatives      Social History:     ROS:     General:  No wt loss, fevers, chills, night sweats, fatigue,   Eyes:  Good vision, no reported pain  ENT:  No sore throat, pain, runny nose, dysphagia  CV:  No pain, palpitations, hypo/hypertension  Resp:  No dyspnea, cough, tachypnea, wheezing  GI:  See HPI  :  No pain, bleeding, incontinence, nocturia  Muscle:  No pain, weakness  Neuro:  No weakness, tingling, memory problems  Psych:  No fatigue, insomnia, mood problems, depression  Endocrine:  No polyuria, polydipsia, cold/heat intolerance  Heme:  No petechiae, ecchymosis, easy bruisability  Skin:  No rash, edema      PHYSICAL EXAM:     GENERAL:  Appears stated age, well-groomed, well-nourished, no distress  HEENT:  NC/AT,  conjunctivae clear and pink,  no JVD  CHEST:  Full & symmetric excursion, no increased effort, breath sounds clear  HEART:  Regular rhythm, S1, S2, no murmur/rub/S3/S4, no abdominal bruit, no edema  ABDOMEN:  Soft, non-tender, non-distended, normoactive bowel sounds,  no masses ,  EXTREMITIES:  no cyanosis,clubbing or edema  SKIN:  No rash/erythema/ecchymoses/petechiae/wounds/abscess/warm/dry  NEURO:  Alert, oriented    Vital Signs:  Vital Signs Last 24 Hrs  T(C): 36.8 (30 Sep 2017 05:35), Max: 36.8 (29 Sep 2017 17:40)  T(F): 98.2 (30 Sep 2017 05:35), Max: 98.2 (29 Sep 2017 17:40)  HR: 68 (30 Sep 2017 05:35) (65 - 87)  BP: 108/65 (30 Sep 2017 05:35) (108/65 - 138/75)  BP(mean): --  RR: 18 (30 Sep 2017 05:35) (16 - 19)  SpO2: 100% (30 Sep 2017 05:35) (98% - 100%)  Daily Height in cm: 190.5 (29 Sep 2017 17:40)    Daily     LABS:                        14.1   5.21  )-----------( 285      ( 30 Sep 2017 05:38 )             43.3     09-30    140  |  102  |  11  ----------------------------<  89  4.1   |  25  |  1.18    Ca    9.6      30 Sep 2017 05:38  Phos  3.6     09-30  Mg     1.7     09-30    TPro  7.1  /  Alb  3.8  /  TBili  1.7<H>  /  DBili  x   /  AST  70<H>  /  ALT  118<H>  /  AlkPhos  316<H>  09-30    LIVER FUNCTIONS - ( 30 Sep 2017 05:38 )  Alb: 3.8 g/dL / Pro: 7.1 g/dL / ALK PHOS: 316 u/L / ALT: 118 u/L / AST: 70 u/L / GGT: x               Amylase Serum--      Lipase [id number]       Ammonia--      Imaging:

## 2017-09-30 NOTE — DISCHARGE NOTE ADULT - CARE PLAN
Principal Discharge DX:	Alcohol-induced acute pancreatitis, unspecified complication status  Goal:	No signs/symptoms of pancreatitis  Instructions for follow-up, activity and diet:	Continue to advance diet as tolerated.  Follow up with your PMD within one week of discharge.  Follow up with Dr. Winkler as outpatient.  Please call his office to make your appointment within 2 weeks.  Secondary Diagnosis:	Liver function abnormality  Instructions for follow-up, activity and diet:	Levels improving.  Follow up with your PMD within one week of discharge for further monitoring.  Secondary Diagnosis:	Gastroesophageal reflux disease without esophagitis  Instructions for follow-up, activity and diet:	Continue with your home medication.

## 2017-09-30 NOTE — PROGRESS NOTE ADULT - ASSESSMENT
62M h/o mild GERD, admitted to  on 9/15 for pancreatitis w/ negative w/u thus far, and transferred to [facility] for further GI w/u, likely diagnosis at this point is ETOH induced pancreatitis in setting of increased ETOH intake past 3-4 months:    1. Alcohol-induced acute pancreatitis, unspecified complication status.    pancreatitis   improving , tolerating oral diet   labs improves   advance diet   g/i f/u appreciated   dc home if no pain after full diet   f/u gi as out patient .   spoke to patient and he is agree with it

## 2017-09-30 NOTE — DISCHARGE NOTE ADULT - MEDICATION SUMMARY - MEDICATIONS TO STOP TAKING
I will STOP taking the medications listed below when I get home from the hospital:    Lypholyte II/Nutrilyte II/TPN Electrolytes intravenous solution  -- 1 each intravenous    Lypholyte II/Nutrilyte II/TPN Electrolytes intravenous solution  -- 1 each intravenous

## 2017-09-30 NOTE — DISCHARGE NOTE ADULT - CARE PROVIDER_API CALL
Rocael Montague), Gastroenterology; Internal Medicine  [address] [place] [address]  Phone: [phone]  Fax: [phone]    Ileana Killian), Internal Medicine  [address] [place] [address]  Phone: [phone]  Fax: [phone]

## 2017-09-30 NOTE — DISCHARGE NOTE ADULT - HOSPITAL COURSE
62M h/o mild GERD, admitted to  on 9/15 for pancreatitis w/ negative w/u thus far, and transferred to [facility] for further GI w/u.  Pancreatitis most likely alcoholic--improving, lipase trending down  Lipase downtrending, liver enzymes improving.  Denies pain.  Seen by GI Dr. Montague who has cleared patient for d/c home.

## 2017-09-30 NOTE — DISCHARGE NOTE ADULT - MEDICATION SUMMARY - MEDICATIONS TO TAKE
I will START or STAY ON the medications listed below when I get home from the hospital:    docusate sodium 100 mg oral capsule  -- 1 cap(s) by mouth 3 times a day  -- Indication: For Constipation    senna oral tablet  -- 2 tab(s) by mouth once a day (at bedtime)  -- Indication: For Constipation    pantoprazole 40 mg intravenous injection  -- 40 milligram(s) intravenous once a day  -- Indication: For Gastroesophageal reflux disease without esophagitis I will START or STAY ON the medications listed below when I get home from the hospital:    docusate sodium 100 mg oral capsule  -- 1 cap(s) by mouth 3 times a day  -- Indication: For Constipation    senna oral tablet  -- 2 tab(s) by mouth once a day (at bedtime)  -- Indication: For Constipation    Protonix 40 mg oral delayed release tablet  -- 1 tab(s) by mouth once a day   -- It is very important that you take or use this exactly as directed.  Do not skip doses or discontinue unless directed by your doctor.  Obtain medical advice before taking any non-prescription drugs as some may affect the action of this medication.  Swallow whole.  Do not crush.    -- Indication: For Gastroesophageal reflux disease without esophagitis

## 2017-09-30 NOTE — DISCHARGE NOTE ADULT - PLAN OF CARE
No signs/symptoms of pancreatitis Continue to advance diet as tolerated.  Follow up with your PMD within one week of discharge.  Follow up with Dr. Winkler as outpatient.  Please call his office to make your appointment within 2 weeks. Levels improving.  Follow up with your PMD within one week of discharge for further monitoring. Continue with your home medication.

## 2017-09-30 NOTE — CONSULT NOTE ADULT - ASSESSMENT
1) Pancreatitis (unknown etiology)  2) GERD    -plan for EUS monday  -NPO Sunday night  -continue IV fluids  -clear liquids for now  - pain control as per primary team  -PPI PO daily 1) Pancreatitis most likely alcoholic--improving, lipase trending down  2) GERD    -regular diet  - pain control as per primary team  -can discharge today 1) Pancreatitis most likely alcoholic--improving, lipase trending down, he reports no abdominal pain for several days now  2) GERD    -advance diet as tolerated  -can discharge today

## 2017-09-30 NOTE — CONSULT NOTE ADULT - ATTENDING COMMENTS
Patient with recent pancreatitis. The cross sectional imaging showed mild pancreatitis. patient is asymptomatic.  Exam is benign (no pain with deep palpation in the epigastric region). OK for patient to go home from our perspective. Please advance his diet (low fat).

## 2017-09-30 NOTE — DISCHARGE NOTE ADULT - PATIENT PORTAL LINK FT
“You can access the [employer] Patient Portal, offered by [facility], by registering with the following website: [email or web address]”

## 2017-09-30 NOTE — DISCHARGE NOTE ADULT - CARE PROVIDERS DIRECT ADDRESSES
[email or web address]

## 2017-10-01 LAB — HIV 1+2 AB+HIV1 P24 AG SERPL QL IA: SIGNIFICANT CHANGE UP

## 2017-10-03 DIAGNOSIS — I10 ESSENTIAL (PRIMARY) HYPERTENSION: ICD-10-CM

## 2017-10-25 ENCOUNTER — APPOINTMENT (OUTPATIENT)
Dept: GASTROENTEROLOGY | Facility: CLINIC | Age: 62
End: 2017-10-25
Payer: COMMERCIAL

## 2017-10-25 VITALS
TEMPERATURE: 97.9 F | RESPIRATION RATE: 16 BRPM | BODY MASS INDEX: 28.35 KG/M2 | OXYGEN SATURATION: 98 % | DIASTOLIC BLOOD PRESSURE: 70 MMHG | SYSTOLIC BLOOD PRESSURE: 128 MMHG | HEART RATE: 81 BPM | WEIGHT: 228 LBS | HEIGHT: 75 IN

## 2017-10-25 DIAGNOSIS — Z87.891 PERSONAL HISTORY OF NICOTINE DEPENDENCE: ICD-10-CM

## 2017-10-25 DIAGNOSIS — K85.90 ACUTE PANCREATITIS WITHOUT NECROSIS OR INFECTION, UNSPECIFIED: ICD-10-CM

## 2017-10-25 DIAGNOSIS — Z78.9 OTHER SPECIFIED HEALTH STATUS: ICD-10-CM

## 2017-10-25 PROCEDURE — 99215 OFFICE O/P EST HI 40 MIN: CPT

## 2017-10-25 RX ORDER — PANTOPRAZOLE SODIUM 20 MG/1
TABLET, DELAYED RELEASE ORAL
Refills: 0 | Status: ACTIVE | COMMUNITY

## 2017-10-26 LAB
ALBUMIN SERPL ELPH-MCNC: 4.3 G/DL
ALP BLD-CCNC: 178 U/L
ALT SERPL-CCNC: 48 U/L
ANION GAP SERPL CALC-SCNC: 15 MMOL/L
AST SERPL-CCNC: 32 U/L
BILIRUB SERPL-MCNC: 0.9 MG/DL
BUN SERPL-MCNC: 11 MG/DL
CALCIUM SERPL-MCNC: 9.8 MG/DL
CHLORIDE SERPL-SCNC: 102 MMOL/L
CO2 SERPL-SCNC: 23 MMOL/L
CREAT SERPL-MCNC: 1.19 MG/DL
GLUCOSE SERPL-MCNC: 95 MG/DL
POTASSIUM SERPL-SCNC: 4.1 MMOL/L
PROT SERPL-MCNC: 7.8 G/DL
SODIUM SERPL-SCNC: 140 MMOL/L
TRIGL SERPL-MCNC: 106 MG/DL

## 2017-11-12 ENCOUNTER — TRANSCRIPTION ENCOUNTER (OUTPATIENT)
Age: 62
End: 2017-11-12

## 2017-12-19 ENCOUNTER — APPOINTMENT (OUTPATIENT)
Dept: GASTROENTEROLOGY | Facility: HOSPITAL | Age: 62
End: 2017-12-19

## 2018-01-23 ENCOUNTER — EMERGENCY (EMERGENCY)
Facility: HOSPITAL | Age: 63
LOS: 1 days | Discharge: HOME | End: 2018-01-23
Attending: EMERGENCY MEDICINE | Admitting: EMERGENCY MEDICINE
Payer: COMMERCIAL

## 2018-01-23 VITALS
SYSTOLIC BLOOD PRESSURE: 149 MMHG | TEMPERATURE: 98 F | DIASTOLIC BLOOD PRESSURE: 103 MMHG | OXYGEN SATURATION: 100 % | HEART RATE: 80 BPM | RESPIRATION RATE: 18 BRPM

## 2018-01-23 DIAGNOSIS — Z98.890 OTHER SPECIFIED POSTPROCEDURAL STATES: Chronic | ICD-10-CM

## 2018-01-23 LAB
ALBUMIN SERPL ELPH-MCNC: 4.5 G/DL — SIGNIFICANT CHANGE UP (ref 3.3–5)
ALP SERPL-CCNC: 349 U/L — HIGH (ref 40–120)
ALT FLD-CCNC: 211 U/L — HIGH (ref 4–41)
AST SERPL-CCNC: 127 U/L — HIGH (ref 4–40)
BASOPHILS # BLD AUTO: 0.03 K/UL — SIGNIFICANT CHANGE UP (ref 0–0.2)
BASOPHILS NFR BLD AUTO: 0.7 % — SIGNIFICANT CHANGE UP (ref 0–2)
BILIRUB DIRECT SERPL-MCNC: 0.5 MG/DL — HIGH (ref 0.1–0.2)
BILIRUB SERPL-MCNC: 1.6 MG/DL — HIGH (ref 0.2–1.2)
BUN SERPL-MCNC: 9 MG/DL — SIGNIFICANT CHANGE UP (ref 7–23)
CALCIUM SERPL-MCNC: 9.5 MG/DL — SIGNIFICANT CHANGE UP (ref 8.4–10.5)
CHLORIDE SERPL-SCNC: 103 MMOL/L — SIGNIFICANT CHANGE UP (ref 98–107)
CO2 SERPL-SCNC: 29 MMOL/L — SIGNIFICANT CHANGE UP (ref 22–31)
CREAT SERPL-MCNC: 1.04 MG/DL — SIGNIFICANT CHANGE UP (ref 0.5–1.3)
EOSINOPHIL # BLD AUTO: 0.05 K/UL — SIGNIFICANT CHANGE UP (ref 0–0.5)
EOSINOPHIL NFR BLD AUTO: 1.2 % — SIGNIFICANT CHANGE UP (ref 0–6)
GLUCOSE SERPL-MCNC: 117 MG/DL — HIGH (ref 70–99)
HCT VFR BLD CALC: 46.2 % — SIGNIFICANT CHANGE UP (ref 39–50)
HGB BLD-MCNC: 14.3 G/DL — SIGNIFICANT CHANGE UP (ref 13–17)
IMM GRANULOCYTES # BLD AUTO: 0.01 # — SIGNIFICANT CHANGE UP
IMM GRANULOCYTES NFR BLD AUTO: 0.2 % — SIGNIFICANT CHANGE UP (ref 0–1.5)
LYMPHOCYTES # BLD AUTO: 1.5 K/UL — SIGNIFICANT CHANGE UP (ref 1–3.3)
LYMPHOCYTES # BLD AUTO: 35.1 % — SIGNIFICANT CHANGE UP (ref 13–44)
MCHC RBC-ENTMCNC: 30.4 PG — SIGNIFICANT CHANGE UP (ref 27–34)
MCHC RBC-ENTMCNC: 31 % — LOW (ref 32–36)
MCV RBC AUTO: 98.1 FL — SIGNIFICANT CHANGE UP (ref 80–100)
MONOCYTES # BLD AUTO: 0.5 K/UL — SIGNIFICANT CHANGE UP (ref 0–0.9)
MONOCYTES NFR BLD AUTO: 11.7 % — SIGNIFICANT CHANGE UP (ref 2–14)
NEUTROPHILS # BLD AUTO: 2.18 K/UL — SIGNIFICANT CHANGE UP (ref 1.8–7.4)
NEUTROPHILS NFR BLD AUTO: 51.1 % — SIGNIFICANT CHANGE UP (ref 43–77)
NRBC # FLD: 0 — SIGNIFICANT CHANGE UP
PLATELET # BLD AUTO: 245 K/UL — SIGNIFICANT CHANGE UP (ref 150–400)
PMV BLD: 10.9 FL — SIGNIFICANT CHANGE UP (ref 7–13)
POTASSIUM SERPL-MCNC: 4.8 MMOL/L — SIGNIFICANT CHANGE UP (ref 3.5–5.3)
POTASSIUM SERPL-SCNC: 4.8 MMOL/L — SIGNIFICANT CHANGE UP (ref 3.5–5.3)
PROT SERPL-MCNC: 7.8 G/DL — SIGNIFICANT CHANGE UP (ref 6–8.3)
RBC # BLD: 4.71 M/UL — SIGNIFICANT CHANGE UP (ref 4.2–5.8)
RBC # FLD: 12.1 % — SIGNIFICANT CHANGE UP (ref 10.3–14.5)
SODIUM SERPL-SCNC: 141 MMOL/L — SIGNIFICANT CHANGE UP (ref 135–145)
WBC # BLD: 4.27 K/UL — SIGNIFICANT CHANGE UP (ref 3.8–10.5)
WBC # FLD AUTO: 4.27 K/UL — SIGNIFICANT CHANGE UP (ref 3.8–10.5)

## 2018-01-23 PROCEDURE — 76705 ECHO EXAM OF ABDOMEN: CPT | Mod: 26

## 2018-01-23 PROCEDURE — 99284 EMERGENCY DEPT VISIT MOD MDM: CPT | Mod: 25

## 2018-01-23 RX ORDER — DIPHENHYDRAMINE HCL 50 MG
25 CAPSULE ORAL ONCE
Qty: 0 | Refills: 0 | Status: COMPLETED | OUTPATIENT
Start: 2018-01-23 | End: 2018-01-23

## 2018-01-23 RX ADMIN — Medication 25 MILLIGRAM(S): at 11:32

## 2018-01-23 NOTE — ED ADULT TRIAGE NOTE - CHIEF COMPLAINT QUOTE
Co itchiness to entire body x 2 weeks. Denies redness, rash or hives to skin. Went to pcp last week and blood work showed elevated liver enzymes. Prescribed Hydroxyzine and taking herbal supplements but not helping.

## 2018-01-23 NOTE — ED PROVIDER NOTE - MEDICAL DECISION MAKING DETAILS
63 y/o M w/ no significant PMHx, w/ 2 weeks of itchiness w/ outpatient lab work showing elevated LFTs. No abd pain. No jaundice. No weight loss or Fhx of biliary cancer. Will repeat liver enzymes, RUQ U/S, medication for itchiness and if labs not worsening will refer to outpatient hepatology clinic.

## 2018-01-23 NOTE — ED PROCEDURE NOTE - PROCEDURE ADDITIONAL DETAILS
Limited RUQ US shows no cholelithiasis, no cholecystitis, normal CBD.  See images and report in chart.   Ly [id number]

## 2018-01-23 NOTE — ED PROVIDER NOTE - PROGRESS NOTE DETAILS
Jerrell att: Off sono by Cele Rolon neg obstruction. Alk phos slight incr, ALT AST Tbil DBil decr. Patient has gastroenterologist appt in 6 days. Also given [facility] Hepatology clinic for followup in case of emergency.

## 2018-01-23 NOTE — ED PROVIDER NOTE - OBJECTIVE STATEMENT
63 y/o M w/ no significant PMHx, presents to the ED c/o itchiness to entire body x2 weeks. Pt states he was taking Amino acid formula, Astragalus, L-lysine, Rhodiola and Tribulus prior to the itching. Pt was seen by PCP last week and had blood work which revealed elevated liver enzymes. Pt's blood work on 1/11/17 showed Alkaline phosphatase of 282, AST of 166, ALT of 288, direct bilirubin of 0.7 and total bilirubin of 2.4. Pt was prescribed Hydroxyzine TID w/ no relief. Pt has appt w/ GI on 1/29/17. Denies fever, chills, night sweats, weight loss or any other complaints. NKDA.

## 2018-01-23 NOTE — ED PROVIDER NOTE - NS_ ATTENDINGSCRIBEDETAILS _ED_A_ED_FT
Dr. Allan:  I personally performed the services described in the documentation, reviewed the documentation recorded by the scribe in my presence and it accurately and completely records my words and action. The scribe's documentation has been prepared under my direction and personally reviewed by me in its entirety. I confirm that the note above accurately reflects all work, treatment, procedures, and medical decision making performed by me.

## 2018-07-28 PROBLEM — Z78.9 ALCOHOL USE: Status: ACTIVE | Noted: 2017-10-25

## 2021-01-19 NOTE — PROGRESS NOTE ADULT - SUBJECTIVE AND OBJECTIVE BOX
patient seen and examine at bed side   no abdominal pain     Vital Signs Last 24 Hrs  T(C): 36.8 (30 Sep 2017 05:35), Max: 36.8 (29 Sep 2017 17:40)  T(F): 98.2 (30 Sep 2017 05:35), Max: 98.2 (29 Sep 2017 17:40)  HR: 68 (30 Sep 2017 05:35) (65 - 87)  BP: 108/65 (30 Sep 2017 05:35) (108/65 - 138/75)  BP(mean): --  RR: 18 (30 Sep 2017 05:35) (18 - 19)  SpO2: 100% (30 Sep 2017 05:35) (100% - 100%)        GENERAL: NAD  	HEENT: EOMI, MMM, no oropharyngeal lesions or erythema appreciated  	Pulm: normal work of breathing, CTABL  	CV: RRR, S1&S2+, systolic murmur grade I   	ABDOMEN: soft, nt, nd, no hepatosplenomegaly  	MSK: nl ROM  	EXTREMITIES:  no appreciable edema in b/l LE  	Neuro: A&Ox3, no focal deficits  SKIN: warm and dry, no visible rash                            14.1   5.21  )-----------( 285      ( 30 Sep 2017 05:38 )             43.3       09-30    140  |  102  |  11  ----------------------------<  89  4.1   |  25  |  1.18    Ca    9.6      30 Sep 2017 05:38  Phos  3.6     09-30  Mg     1.7     09-30    TPro  7.1  /  Alb  3.8  /  TBili  1.7<H>  /  DBili  x   /  AST  70<H>  /  ALT  118<H>  /  AlkPhos  316<H>  09-30      GI consult appreciated walking

## 2022-08-17 NOTE — PATIENT PROFILE ADULT. - CAREGIVER
----- Message from FILOMENA Holland sent at 8/16/2022 11:21 AM CDT -----  Please let Mr. Galeano know that his CT head is negative.     Thanks    Yes

## 2023-11-01 NOTE — PATIENT PROFILE ADULT. - URINARY CATHETER
What to Expect After a Cystoscopy  For the next 24-48 hours, you may feel a mild burning when you urinate. This burning is normal and expected. Drink plenty of water to dilute the urine to help relieve the burning sensation. You may also see a small amount of blood in your urine after the procedure.    Unless you are already taking antibiotics, you may be given an antibiotic after the test to prevent infection.    Signs and Symptoms to Report  Call the [facility] at [phone] if you develop any of the following:  Fever of 101 degrees or higher  Chills or persistent bleeding  Inability to urinate  
no

## 2023-12-27 NOTE — PATIENT PROFILE ADULT. - BILL OF RIGHTS/ADMISSION INFORMATION PROVIDED TO:
LCSW spoke to Stephanie and pt isn't sure about completing PHP again and wants to think about it.  Pt is looking for a new individual therapist as she feels like current therapist (Derrick Kapadia) isn't what she needs right now and she needs to move on from him.  Pt is also interested in doing OP groups.  Pt shared she is open to doing whatever Dr. Saleem recommends at their psych visit scheduled for Friday 12/29.  Thanked pt for the call back.    Patient

## 2023-12-30 NOTE — PATIENT PROFILE ADULT. - TEACHING/LEARNING LEARNING PREFERENCES
43 y.o M c.o allergic reaction seen by NP francisco Pt denies CP, SOB, HA, vision changes, n/v/d, fevers chills, abdominal pain. Upon assessment, abdomen soft and nontender, +strong peripheral pulses, moving all extremities without difficulty.
audio

## 2024-09-13 NOTE — ED ADULT TRIAGE NOTE - TEMPERATURE IN CELSIUS (DEGREES C)
36.7 Odomzo Counseling- I discussed with the patient the risks of Odomzo including but not limited to nausea, vomiting, diarrhea, constipation, weight loss, changes in the sense of taste, decreased appetite, muscle spasms, and hair loss.  The patient verbalized understanding of the proper use and possible adverse effects of Odomzo.  All of the patient's questions and concerns were addressed. Azathioprine Pregnancy And Lactation Text: This medication is Pregnancy Category D and isn't considered safe during pregnancy. It is unknown if this medication is excreted in breast milk. Xolair Counseling:  Patient informed of potential adverse effects including but not limited to fever, muscle aches, rash and allergic reactions.  The patient verbalized understanding of the proper use and possible adverse effects of Xolair.  All of the patient's questions and concerns were addressed. Arava Counseling:  Patient counseled regarding adverse effects of Arava including but not limited to nausea, vomiting, abnormalities in liver function tests. Patients may develop mouth sores, rash, diarrhea, and abnormalities in blood counts. The patient understands that monitoring is required including LFTs and blood counts.  There is a rare possibility of scarring of the liver and lung problems that can occur when taking methotrexate. Persistent nausea, loss of appetite, pale stools, dark urine, cough, and shortness of breath should be reported immediately. Patient advised to discontinue Arava treatment and consult with a physician prior to attempting conception. The patient will have to undergo a treatment to eliminate Arava from the body prior to conception. Dupixent Counseling: I discussed with the patient the risks of dupilumab including but not limited to eye inflammation and irritation, cold sores, injection site reactions, allergic reactions and increased risk of parasitic infection. The patient understands that monitoring is required and they must alert us or the primary physician if symptoms of infection or other concerning signs are noted. Tazorac Pregnancy And Lactation Text: This medication is not safe during pregnancy. It is unknown if this medication is excreted in breast milk. Niacinamide Counseling: I recommended taking niacin or niacinamide, also know as vitamin B3, twice daily. Recent evidence suggests that taking vitamin B3 (500 mg twice daily) can reduce the risk of actinic keratoses and non-melanoma skin cancers. Side effects of vitamin B3 include flushing and headache. Bactrim Pregnancy And Lactation Text: This medication is Pregnancy Category D and is known to cause fetal risk.  It is also excreted in breast milk. Hyrimoz Pregnancy And Lactation Text: This medication is Pregnancy Category B and is considered safe during pregnancy. It is unknown if this medication is excreted in breast milk. Tremfya Pregnancy And Lactation Text: The risk during pregnancy and breastfeeding is uncertain with this medication. Clofazimine Counseling:  I discussed with the patient the risks of clofazimine including but not limited to skin and eye pigmentation, liver damage, nausea/vomiting, gastrointestinal bleeding and allergy. Qbrexza Counseling:  I discussed with the patient the risks of Qbrexza including but not limited to headache, mydriasis, blurred vision, dry eyes, nasal dryness, dry mouth, dry throat, dry skin, urinary hesitation, and constipation.  Local skin reactions including erythema, burning, stinging, and itching can also occur. Dapsone Counseling: I discussed with the patient the risks of dapsone including but not limited to hemolytic anemia, agranulocytosis, rashes, methemoglobinemia, kidney failure, peripheral neuropathy, headaches, GI upset, and liver toxicity.  Patients who start dapsone require monitoring including baseline LFTs and weekly CBCs for the first month, then every month thereafter.  The patient verbalized understanding of the proper use and possible adverse effects of dapsone.  All of the patient's questions and concerns were addressed. Finasteride Pregnancy And Lactation Text: This medication is absolutely contraindicated during pregnancy. It is unknown if it is excreted in breast milk. Hydroxyzine Counseling: Patient advised that the medication is sedating and not to drive a car after taking this medication.  Patient informed of potential adverse effects including but not limited to dry mouth, urinary retention, and blurry vision.  The patient verbalized understanding of the proper use and possible adverse effects of hydroxyzine.  All of the patient's questions and concerns were addressed. Wartpeel Pregnancy And Lactation Text: This medication is Pregnancy Category X and contraindicated in pregnancy and in women who may become pregnant. It is unknown if this medication is excreted in breast milk. Isotretinoin Counseling: Patient should get monthly blood tests, not donate blood, not drive at night if vision affected, not share medication, and not undergo elective surgery for 6 months after tx completed. Side effects reviewed, pt to contact office should one occur. Valtrex Pregnancy And Lactation Text: this medication is Pregnancy Category B and is considered safe during pregnancy. This medication is not directly found in breast milk but it's metabolite acyclovir is present. Topical Steroids Applications Pregnancy And Lactation Text: Most topical steroids are considered safe to use during pregnancy and lactation.  Any topical steroid applied to the breast or nipple should be washed off before breastfeeding. Cosentyx Counseling:  I discussed with the patient the risks of Cosentyx including but not limited to worsening of Crohn's disease, immunosuppression, allergic reactions and infections.  The patient understands that monitoring is required including a PPD at baseline and must alert us or the primary physician if symptoms of infection or other concerning signs are noted. Colchicine Pregnancy And Lactation Text: This medication is Pregnancy Category C and isn't considered safe during pregnancy. It is excreted in breast milk. Winlevi Counseling:  I discussed with the patient the risks of topical clascoterone including but not limited to erythema, scaling, itching, and stinging. Patient voiced their understanding. Oxybutynin Counseling:  I discussed with the patient the risks of oxybutynin including but not limited to skin rash, drowsiness, dry mouth, difficulty urinating, and blurred vision. Bexarotene Counseling:  I discussed with the patient the risks of bexarotene including but not limited to hair loss, dry lips/skin/eyes, liver abnormalities, hyperlipidemia, pancreatitis, depression/suicidal ideation, photosensitivity, drug rash/allergic reactions, hypothyroidism, anemia, leukopenia, infection, cataracts, and teratogenicity.  Patient understands that they will need regular blood tests to check lipid profile, liver function tests, white blood cell count, thyroid function tests and pregnancy test if applicable. Benzoyl Peroxide Counseling: Patient counseled that medicine may cause skin irritation and bleach clothing.  In the event of skin irritation, the patient was advised to reduce the amount of the drug applied or use it less frequently.   The patient verbalized understanding of the proper use and possible adverse effects of benzoyl peroxide.  All of the patient's questions and concerns were addressed. Cellcept Counseling:  I discussed with the patient the risks of mycophenolate mofetil including but not limited to infection/immunosuppression, GI upset, hypokalemia, hypercholesterolemia, bone marrow suppression, lymphoproliferative disorders, malignancy, GI ulceration/bleed/perforation, colitis, interstitial lung disease, kidney failure, progressive multifocal leukoencephalopathy, and birth defects.  The patient understands that monitoring is required including a baseline creatinine and regular CBC testing. In addition, patient must alert us immediately if symptoms of infection or other concerning signs are noted. Topical Clindamycin Counseling: Patient counseled that this medication may cause skin irritation or allergic reactions.  In the event of skin irritation, the patient was advised to reduce the amount of the drug applied or use it less frequently.   The patient verbalized understanding of the proper use and possible adverse effects of clindamycin.  All of the patient's questions and concerns were addressed. Acitretin Pregnancy And Lactation Text: This medication is Pregnancy Category X and should not be given to women who are pregnant or may become pregnant in the future. This medication is excreted in breast milk. Solaraze Pregnancy And Lactation Text: This medication is Pregnancy Category B and is considered safe. There is some data to suggest avoiding during the third trimester. It is unknown if this medication is excreted in breast milk. Cephalexin Counseling: I counseled the patient regarding use of cephalexin as an antibiotic for prophylactic and/or therapeutic purposes. Cephalexin (commonly prescribed under brand name Keflex) is a cephalosporin antibiotic which is active against numerous classes of bacteria, including most skin bacteria. Side effects may include nausea, diarrhea, gastrointestinal upset, rash, hives, yeast infections, and in rare cases, hepatitis, kidney disease, seizures, fever, confusion, neurologic symptoms, and others. Patients with severe allergies to penicillin medications are cautioned that there is about a 10% incidence of cross-reactivity with cephalosporins. When possible, patients with penicillin allergies should use alternatives to cephalosporins for antibiotic therapy. Imiquimod Pregnancy And Lactation Text: This medication is Pregnancy Category C. It is unknown if this medication is excreted in breast milk. Aklief Pregnancy And Lactation Text: It is unknown if this medication is safe to use during pregnancy.  It is unknown if this medication is excreted in breast milk.  Breastfeeding women should use the topical cream on the smallest area of the skin for the shortest time needed while breastfeeding.  Do not apply to nipple and areola. Cimetidine Counseling:  I discussed with the patient the risks of Cimetidine including but not limited to gynecomastia, headache, diarrhea, nausea, drowsiness, arrhythmias, pancreatitis, skin rashes, psychosis, bone marrow suppression and kidney toxicity. Azelaic Acid Counseling: Patient counseled that medicine may cause skin irritation and to avoid applying near the eyes.  In the event of skin irritation, the patient was advised to reduce the amount of the drug applied or use it less frequently.   The patient verbalized understanding of the proper use and possible adverse effects of azelaic acid.  All of the patient's questions and concerns were addressed. 5-Fu Counseling: 5-Fluorouracil Counseling:  I discussed with the patient the risks of 5-fluorouracil including but not limited to erythema, scaling, itching, weeping, crusting, and pain. Humira Counseling:  I discussed with the patient the risks of adalimumab including but not limited to myelosuppression, immunosuppression, autoimmune hepatitis, demyelinating diseases, lymphoma, and serious infections.  The patient understands that monitoring is required including a PPD at baseline and must alert us or the primary physician if symptoms of infection or other concerning signs are noted. Glycopyrrolate Counseling:  I discussed with the patient the risks of glycopyrrolate including but not limited to skin rash, drowsiness, dry mouth, difficulty urinating, and blurred vision. Wartpeel Counseling:  I discussed with the patient the risks of Wartpeel including but not limited to erythema, scaling, itching, weeping, crusting, and pain. Sarecycline Counseling: Patient advised regarding possible photosensitivity and discoloration of the teeth, skin, lips, tongue and gums.  Patient instructed to avoid sunlight, if possible.  When exposed to sunlight, patients should wear protective clothing, sunglasses, and sunscreen.  The patient was instructed to call the office immediately if the following severe adverse effects occur:  hearing changes, easy bruising/bleeding, severe headache, or vision changes.  The patient verbalized understanding of the proper use and possible adverse effects of sarecycline.  All of the patient's questions and concerns were addressed. Olanzapine Counseling- I discussed with the patient the common side effects of olanzapine including but are not limited to: lack of energy, dry mouth, increased appetite, sleepiness, tremor, constipation, dizziness, changes in behavior, or restlessness.  Explained that teenagers are more likely to experience headaches, abdominal pain, pain in the arms or legs, tiredness, and sleepiness.  Serious side effects include but are not limited: increased risk of death in elderly patients who are confused, have memory loss, or dementia-related psychosis; hyperglycemia; increased cholesterol and triglycerides; and weight gain. Rhofade Pregnancy And Lactation Text: This medication has not been assigned a Pregnancy Risk Category. It is unknown if the medication is excreted in breast milk. Erivedge Counseling- I discussed with the patient the risks of Erivedge including but not limited to nausea, vomiting, diarrhea, constipation, weight loss, changes in the sense of taste, decreased appetite, muscle spasms, and hair loss.  The patient verbalized understanding of the proper use and possible adverse effects of Erivedge.  All of the patient's questions and concerns were addressed. Opioid Counseling: I discussed with the patient the potential side effects of opioids including but not limited to addiction, altered mental status, and depression. I stressed avoiding alcohol, benzodiazepines, muscle relaxants and sleep aids unless specifically okayed by a physician. The patient verbalized understanding of the proper use and possible adverse effects of opioids. All of the patient's questions and concerns were addressed. They were instructed to flush the remaining pills down the toilet if they did not need them for pain. Propranolol Pregnancy And Lactation Text: This medication is Pregnancy Category C and it isn't known if it is safe during pregnancy. It is excreted in breast milk. Use Enhanced Medication Counseling?: No Siliq Counseling:  I discussed with the patient the risks of Siliq including but not limited to new or worsening depression, suicidal thoughts and behavior, immunosuppression, malignancy, posterior leukoencephalopathy syndrome, and serious infections.  The patient understands that monitoring is required including a PPD at baseline and must alert us or the primary physician if symptoms of infection or other concerning signs are noted. There is also a special program designed to monitor depression which is required with Siliq. Qbrexza Pregnancy And Lactation Text: There is no available data on Qbrexza use in pregnant women.  There is no available data on Qbrexza use in lactation. Otezla Pregnancy And Lactation Text: This medication is Pregnancy Category C and it isn't known if it is safe during pregnancy. It is unknown if it is excreted in breast milk. Opzelura Pregnancy And Lactation Text: There is insufficient data to evaluate drug-associated risk for major birth defects, miscarriage, or other adverse maternal or fetal outcomes.  There is a pregnancy registry that monitors pregnancy outcomes in pregnant persons exposed to the medication during pregnancy.  It is unknown if this medication is excreted in breast milk.  Do not breastfeed during treatment and for about 4 weeks after the last dose. Simponi Counseling:  I discussed with the patient the risks of golimumab including but not limited to myelosuppression, immunosuppression, autoimmune hepatitis, demyelinating diseases, lymphoma, and serious infections.  The patient understands that monitoring is required including a PPD at baseline and must alert us or the primary physician if symptoms of infection or other concerning signs are noted. Zyclara Counseling:  I discussed with the patient the risks of imiquimod including but not limited to erythema, scaling, itching, weeping, crusting, and pain.  Patient understands that the inflammatory response to imiquimod is variable from person to person and was educated regarded proper titration schedule.  If flu-like symptoms develop, patient knows to discontinue the medication and contact us. Cibinqo Counseling: I discussed with the patient the risks of Cibinqo therapy including but not limited to common cold, nausea, headache, cold sores, increased blood CPK levels, dizziness, UTIs, fatigue, acne, and vomitting. Live vaccines should be avoided.  This medication has been linked to serious infections; higher rate of mortality; malignancy and lymphoproliferative disorders; major adverse cardiovascular events; thrombosis; thrombocytopenia and lymphopenia; lipid elevations; and retinal detachment. Sarecycline Pregnancy And Lactation Text: This medication is Pregnancy Category D and not consider safe during pregnancy. It is also excreted in breast milk. Cimetidine Pregnancy And Lactation Text: This medication is Pregnancy Category B and is considered safe during pregnancy. It is also excreted in breast milk and breast feeding isn't recommended. Oral Minoxidil Counseling- I discussed with the patient the risks of oral minoxidil including but not limited to shortness of breath, swelling of the feet or ankles, dizziness, lightheadedness, unwanted hair growth and allergic reaction.  The patient verbalized understanding of the proper use and possible adverse effects of oral minoxidil.  All of the patient's questions and concerns were addressed. Arava Pregnancy And Lactation Text: This medication is Pregnancy Category X and is absolutely contraindicated during pregnancy. It is unknown if it is excreted in breast milk. Dutasteride Male Counseling: Dustasteride Counseling:  I discussed with the patient the risks of use of dutasteride including but not limited to decreased libido, decreased ejaculate volume, and gynecomastia. Women who can become pregnant should not handle medication.  All of the patient's questions and concerns were addressed. Drysol Pregnancy And Lactation Text: This medication is considered safe during pregnancy and breast feeding. Tremfya Counseling: I discussed with the patient the risks of guselkumab including but not limited to immunosuppression, serious infections, and drug reactions.  The patient understands that monitoring is required including a PPD at baseline and must alert us or the primary physician if symptoms of infection or other concerning signs are noted. Birth Control Pills Counseling: Birth Control Pill Counseling: I discussed with the patient the potential side effects of OCPs including but not limited to increased risk of stroke, heart attack, thrombophlebitis, deep venous thrombosis, hepatic adenomas, breast changes, GI upset, headaches, and depression.  The patient verbalized understanding of the proper use and possible adverse effects of OCPs. All of the patient's questions and concerns were addressed. Olumiant Counseling: I discussed with the patient the risks of Olumiant therapy including but not limited to upper respiratory tract infections, shingles, cold sores, and nausea. Live vaccines should be avoided.  This medication has been linked to serious infections; higher rate of mortality; malignancy and lymphoproliferative disorders; major adverse cardiovascular events; thrombosis; gastrointestinal perforations; neutropenia; lymphopenia; anemia; liver enzyme elevations; and lipid elevations. Methotrexate Pregnancy And Lactation Text: This medication is Pregnancy Category X and is known to cause fetal harm. This medication is excreted in breast milk. High Dose Vitamin A Counseling: Side effects reviewed, pt to contact office should one occur. Soolantra Counseling: I discussed with the patients the risks of topial Soolantra. This is a medicine which decreases the number of mites and inflammation in the skin. You experience burning, stinging, eye irritation or allergic reactions.  Please call our office if you develop any problems from using this medication. Picato Counseling:  I discussed with the patient the risks of Picato including but not limited to erythema, scaling, itching, weeping, crusting, and pain. Cyclophosphamide Pregnancy And Lactation Text: This medication is Pregnancy Category D and it isn't considered safe during pregnancy. This medication is excreted in breast milk. Benzoyl Peroxide Pregnancy And Lactation Text: This medication is Pregnancy Category C. It is unknown if benzoyl peroxide is excreted in breast milk. Oxybutynin Pregnancy And Lactation Text: This medication is Pregnancy Category B and is considered safe during pregnancy. It is unknown if it is excreted in breast milk. Cibinqo Pregnancy And Lactation Text: It is unknown if this medication will adversely affect pregnancy or breast feeding.  You should not take this medication if you are currently pregnant or planning a pregnancy or while breastfeeding. Litfulo Counseling: I discussed with the patient the risks of Litfulo therapy including but not limited to upper respiratory tract infections, shingles, cold sores, and nausea. Live vaccines should be avoided.  This medication has been linked to serious infections; higher rate of mortality; malignancy and lymphoproliferative disorders; major adverse cardiovascular events; thrombosis; gastrointestinal perforations; neutropenia; lymphopenia; anemia; liver enzyme elevations; and lipid elevations. Rituxan Pregnancy And Lactation Text: This medication is Pregnancy Category C and it isn't know if it is safe during pregnancy. It is unknown if this medication is excreted in breast milk but similar antibodies are known to be excreted. Topical Ketoconazole Counseling: Patient counseled that this medication may cause skin irritation or allergic reactions.  In the event of skin irritation, the patient was advised to reduce the amount of the drug applied or use it less frequently.   The patient verbalized understanding of the proper use and possible adverse effects of ketoconazole.  All of the patient's questions and concerns were addressed. Niacinamide Pregnancy And Lactation Text: These medications are considered safe during pregnancy. Clindamycin Counseling: I counseled the patient regarding use of clindamycin as an antibiotic for prophylactic and/or therapeutic purposes. Clindamycin is active against numerous classes of bacteria, including skin bacteria. Side effects may include nausea, diarrhea, gastrointestinal upset, rash, hives, yeast infections, and in rare cases, colitis. Hydroquinone Pregnancy And Lactation Text: This medication has not been assigned a Pregnancy Risk Category but animal studies failed to show danger with the topical medication. It is unknown if the medication is excreted in breast milk. Low Dose Naltrexone Pregnancy And Lactation Text: Naltrexone is pregnancy category C.  There have been no adequate and well-controlled studies in pregnant women.  It should be used in pregnancy only if the potential benefit justifies the potential risk to the fetus.   Limited data indicates that naltrexone is minimally excreted into breastmilk. Stelara Counseling:  I discussed with the patient the risks of ustekinumab including but not limited to immunosuppression, malignancy, posterior leukoencephalopathy syndrome, and serious infections.  The patient understands that monitoring is required including a PPD at baseline and must alert us or the primary physician if symptoms of infection or other concerning signs are noted. Vtama Pregnancy And Lactation Text: It is unknown if this medication can cause problems during pregnancy and breastfeeding. Cimzia Pregnancy And Lactation Text: This medication crosses the placenta but can be considered safe in certain situations. Cimzia may be excreted in breast milk. Olumiant Pregnancy And Lactation Text: Based on animal studies, Olumiant may cause embryo-fetal harm when administered to pregnant women.  The medication should not be used in pregnancy.  Breastfeeding is not recommended during treatment. Bactrim Counseling:  I discussed with the patient the risks of sulfa antibiotics including but not limited to GI upset, allergic reaction, drug rash, diarrhea, dizziness, photosensitivity, and yeast infections.  Rarely, more serious reactions can occur including but not limited to aplastic anemia, agranulocytosis, methemoglobinemia, blood dyscrasias, liver or kidney failure, lung infiltrates or desquamative/blistering drug rashes. Cyclophosphamide Counseling:  I discussed with the patient the risks of cyclophosphamide including but not limited to hair loss, hormonal abnormalities, decreased fertility, abdominal pain, diarrhea, nausea and vomiting, bone marrow suppression and infection. The patient understands that monitoring is required while taking this medication. Sotyktu Counseling:  I discussed the most common side effects of Sotyktu including: common cold, sore throat, sinus infections, cold sores, canker sores, folliculitis, and acne.  I also discussed more serious side effects of Sotyktu including but not limited to: serious allergic reactions; increased risk for infections such as TB; cancers such as lymphomas; rhabdomyolysis and elevated CPK; and elevated triglycerides and liver enzymes.  Elidel Counseling: Patient may experience a mild burning sensation during topical application. Elidel is not approved in children less than 2 years of age. There have been case reports of hematologic and skin malignancies in patients using topical calcineurin inhibitors although causality is questionable. Ilumya Counseling: I discussed with the patient the risks of tildrakizumab including but not limited to immunosuppression, malignancy, posterior leukoencephalopathy syndrome, and serious infections.  The patient understands that monitoring is required including a PPD at baseline and must alert us or the primary physician if symptoms of infection or other concerning signs are noted. Glycopyrrolate Pregnancy And Lactation Text: This medication is Pregnancy Category B and is considered safe during pregnancy. It is unknown if it is excreted breast milk. Clindamycin Pregnancy And Lactation Text: This medication can be used in pregnancy if certain situations. Clindamycin is also present in breast milk. Cantharidin Pregnancy And Lactation Text: This medication has not been proven safe during pregnancy. It is unknown if this medication is excreted in breast milk. Adbry Pregnancy And Lactation Text: It is unknown if this medication will adversely affect pregnancy or breast feeding. Spironolactone Pregnancy And Lactation Text: This medication can cause feminization of the male fetus and should be avoided during pregnancy. The active metabolite is also found in breast milk. Bimzelx Counseling:  I discussed with the patient the risks of Bimzelx including but not limited to depression, immunosuppression, allergic reactions and infections.  The patient understands that monitoring is required including a PPD at baseline and must alert us or the primary physician if symptoms of infection or other concerning signs are noted. Tranexamic Acid Counseling:  Patient advised of the small risk of bleeding problems with tranexamic acid. They were also instructed to call if they developed any nausea, vomiting or diarrhea. All of the patient's questions and concerns were addressed. Topical Steroids Counseling: I discussed with the patient that prolonged use of topical steroids can result in the increased appearance of superficial blood vessels (telangiectasias), lightening (hypopigmentation) and thinning of the skin (atrophy).  Patient understands to avoid using high potency steroids in skin folds, the groin or the face.  The patient verbalized understanding of the proper use and possible adverse effects of topical steroids.  All of the patient's questions and concerns were addressed. Solaraze Counseling:  I discussed with the patient the risks of Solaraze including but not limited to erythema, scaling, itching, weeping, crusting, and pain. Topical Sulfur Applications Counseling: Topical Sulfur Counseling: Patient counseled that this medication may cause skin irritation or allergic reactions.  In the event of skin irritation, the patient was advised to reduce the amount of the drug applied or use it less frequently.   The patient verbalized understanding of the proper use and possible adverse effects of topical sulfur application.  All of the patient's questions and concerns were addressed. Enbrel Counseling:  I discussed with the patient the risks of etanercept including but not limited to myelosuppression, immunosuppression, autoimmune hepatitis, demyelinating diseases, lymphoma, and infections.  The patient understands that monitoring is required including a PPD at baseline and must alert us or the primary physician if symptoms of infection or other concerning signs are noted. Topical Sulfur Applications Pregnancy And Lactation Text: This medication is considered safe during pregnancy and breast feeding secondary to limited systemic absorption. Tetracycline Counseling: Patient counseled regarding possible photosensitivity and increased risk for sunburn.  Patient instructed to avoid sunlight, if possible.  When exposed to sunlight, patients should wear protective clothing, sunglasses, and sunscreen.  The patient was instructed to call the office immediately if the following severe adverse effects occur:  hearing changes, easy bruising/bleeding, severe headache, or vision changes.  The patient verbalized understanding of the proper use and possible adverse effects of tetracycline.  All of the patient's questions and concerns were addressed. Patient understands to avoid pregnancy while on therapy due to potential birth defects. Cephalexin Pregnancy And Lactation Text: This medication is Pregnancy Category B and considered safe during pregnancy.  It is also excreted in breast milk but can be used safely for shorter doses. Topical Metronidazole Counseling: Metronidazole is a topical antibiotic medication. You may experience burning, stinging, redness, or allergic reactions.  Please call our office if you develop any problems from using this medication. Opioid Pregnancy And Lactation Text: These medications can lead to premature delivery and should be avoided during pregnancy. These medications are also present in breast milk in small amounts. Ivermectin Counseling:  Patient instructed to take medication on an empty stomach with a full glass of water.  Patient informed of potential adverse effects including but not limited to nausea, diarrhea, dizziness, itching, and swelling of the extremities or lymph nodes.  The patient verbalized understanding of the proper use and possible adverse effects of ivermectin.  All of the patient's questions and concerns were addressed. Soolantra Pregnancy And Lactation Text: This medication is Pregnancy Category C. This medication is considered safe during breast feeding. Klisyri Counseling:  I discussed with the patient the risks of Klisyri including but not limited to erythema, scaling, itching, weeping, crusting, and pain. Dapsone Pregnancy And Lactation Text: This medication is Pregnancy Category C and is not considered safe during pregnancy or breast feeding. Nsaids Pregnancy And Lactation Text: These medications are considered safe up to 30 weeks gestation. It is excreted in breast milk. Low Dose Naltrexone Counseling- I discussed with the patient the potential risks and side effects of low dose naltrexone including but not limited to: more vivid dreams, headaches, nausea, vomiting, abdominal pain, fatigue, dizziness, and anxiety. Opzelura Counseling:  I discussed with the patient the risks of Opzelura including but not limited to nasopharngitis, bronchitis, ear infection, eosinophila, hives, diarrhea, folliculitis, tonsillitis, and rhinorrhea.  Taken orally, this medication has been linked to serious infections; higher rate of mortality; malignancy and lymphoproliferative disorders; major adverse cardiovascular events; thrombosis; thrombocytopenia, anemia, and neutropenia; and lipid elevations. Propranolol Counseling:  I discussed with the patient the risks of propranolol including but not limited to low heart rate, low blood pressure, low blood sugar, restlessness and increased cold sensitivity. They should call the office if they experience any of these side effects. Calcipotriene Pregnancy And Lactation Text: The use of this medication during pregnancy or lactation is not recommended as there is insufficient data. Doxepin Pregnancy And Lactation Text: This medication is Pregnancy Category C and it isn't known if it is safe during pregnancy. It is also excreted in breast milk and breast feeding isn't recommended. High Dose Vitamin A Pregnancy And Lactation Text: High dose vitamin A therapy is contraindicated during pregnancy and breast feeding. Olanzapine Pregnancy And Lactation Text: This medication is pregnancy category C.   There are no adequate and well controlled trials with olanzapine in pregnant females.  Olanzapine should be used during pregnancy only if the potential benefit justifies the potential risk to the fetus.   In a study in lactating healthy women, olanzapine was excreted in breast milk.  It is recommended that women taking olanzapine should not breast feed. Litfulo Pregnancy And Lactation Text: Based on animal studies, Lifulo may cause embryo-fetal harm when administered to pregnant women.  The medication should not be used in pregnancy.  Breastfeeding is not recommended during treatment. Dupixent Pregnancy And Lactation Text: This medication likely crosses the placenta but the risk for the fetus is uncertain. This medication is excreted in breast milk. Rhofade Counseling: Rhofade is a topical medication which can decrease superficial blood flow where applied. Side effects are uncommon and include stinging, redness and allergic reactions. Doxepin Counseling:  Patient advised that the medication is sedating and not to drive a car after taking this medication. Patient informed of potential adverse effects including but not limited to dry mouth, urinary retention, and blurry vision.  The patient verbalized understanding of the proper use and possible adverse effects of doxepin.  All of the patient's questions and concerns were addressed. Otezla Counseling: The side effects of Otezla were discussed with the patient, including but not limited to worsening or new depression, weight loss, diarrhea, nausea, upper respiratory tract infection, and headache. Patient instructed to call the office should any adverse effect occur.  The patient verbalized understanding of the proper use and possible adverse effects of Otezla.  All the patient's questions and concerns were addressed. Drysol Counseling:  I discussed with the patient the risks of drysol/aluminum chloride including but not limited to skin rash, itching, irritation, burning. Rinvoq Counseling: I discussed with the patient the risks of Rinvoq therapy including but not limited to upper respiratory tract infections, shingles, cold sores, bronchitis, nausea, cough, fever, acne, and headache. Live vaccines should be avoided.  This medication has been linked to serious infections; higher rate of mortality; malignancy and lymphoproliferative disorders; major adverse cardiovascular events; thrombosis; thrombocytopenia, anemia, and neutropenia; lipid elevations; liver enzyme elevations; and gastrointestinal perforations. Libtayo Pregnancy And Lactation Text: This medication is contraindicated in pregnancy and when breast feeding. Acitretin Counseling:  I discussed with the patient the risks of acitretin including but not limited to hair loss, dry lips/skin/eyes, liver damage, hyperlipidemia, depression/suicidal ideation, photosensitivity.  Serious rare side effects can include but are not limited to pancreatitis, pseudotumor cerebri, bony changes, clot formation/stroke/heart attack.  Patient understands that alcohol is contraindicated since it can result in liver toxicity and significantly prolong the elimination of the drug by many years. Azithromycin Pregnancy And Lactation Text: This medication is considered safe during pregnancy and is also secreted in breast milk. Protopic Pregnancy And Lactation Text: This medication is Pregnancy Category C. It is unknown if this medication is excreted in breast milk when applied topically. Finasteride Male Counseling: Finasteride Counseling:  I discussed with the patient the risks of use of finasteride including but not limited to decreased libido, decreased ejaculate volume, gynecomastia, and depression. Women should not handle medication.  All of the patient's questions and concerns were addressed. Sotyktu Pregnancy And Lactation Text: There is insufficient data to evaluate whether or not Sotyktu is safe to use during pregnancy.   It is not known if Sotyktu passes into breast milk and whether or not it is safe to use when breastfeeding.   Taltz Counseling: I discussed with the patient the risks of ixekizumab including but not limited to immunosuppression, serious infections, worsening of inflammatory bowel disease and drug reactions.  The patient understands that monitoring is required including a PPD at baseline and must alert us or the primary physician if symptoms of infection or other concerning signs are noted. Aklief counseling:  Patient advised to apply a pea-sized amount only at bedtime and wait 30 minutes after washing their face before applying.  If too drying, patient may add a non-comedogenic moisturizer.  The most commonly reported side effects including irritation, redness, scaling, dryness, stinging, burning, itching, and increased risk of sunburn.  The patient verbalized understanding of the proper use and possible adverse effects of retinoids.  All of the patient's questions and concerns were addressed. Gabapentin Counseling: I discussed with the patient the risks of gabapentin including but not limited to dizziness, somnolence, fatigue and ataxia. Quinolones Pregnancy And Lactation Text: This medication is Pregnancy Category C and it isn't know if it is safe during pregnancy. It is also excreted in breast milk. Cyclosporine Counseling:  I discussed with the patient the risks of cyclosporine including but not limited to hypertension, gingival hyperplasia,myelosuppression, immunosuppression, liver damage, kidney damage, neurotoxicity, lymphoma, and serious infections. The patient understands that monitoring is required including baseline blood pressure, CBC, CMP, lipid panel and uric acid, and then 1-2 times monthly CMP and blood pressure. Cimzia Counseling:  I discussed with the patient the risks of Cimzia including but not limited to immunosuppression, allergic reactions and infections.  The patient understands that monitoring is required including a PPD at baseline and must alert us or the primary physician if symptoms of infection or other concerning signs are noted. Doxycycline Counseling:  Patient counseled regarding possible photosensitivity and increased risk for sunburn.  Patient instructed to avoid sunlight, if possible.  When exposed to sunlight, patients should wear protective clothing, sunglasses, and sunscreen.  The patient was instructed to call the office immediately if the following severe adverse effects occur:  hearing changes, easy bruising/bleeding, severe headache, or vision changes.  The patient verbalized understanding of the proper use and possible adverse effects of doxycycline.  All of the patient's questions and concerns were addressed. Doxycycline Pregnancy And Lactation Text: This medication is Pregnancy Category D and not consider safe during pregnancy. It is also excreted in breast milk but is considered safe for shorter treatment courses. Rinvoq Pregnancy And Lactation Text: Based on animal studies, Rinvoq may cause embryo-fetal harm when administered to pregnant women.  The medication should not be used in pregnancy.  Breastfeeding is not recommended during treatment and for 6 days after the last dose. Prednisone Counseling:  I discussed with the patient the risks of prolonged use of prednisone including but not limited to weight gain, insomnia, osteoporosis, mood changes, diabetes, susceptibility to infection, glaucoma and high blood pressure.  In cases where prednisone use is prolonged, patients should be monitored with blood pressure checks, serum glucose levels and an eye exam.  Additionally, the patient may need to be placed on GI prophylaxis, PCP prophylaxis, and calcium and vitamin D supplementation and/or a bisphosphonate.  The patient verbalized understanding of the proper use and the possible adverse effects of prednisone.  All of the patient's questions and concerns were addressed. Skyrizi Counseling: I discussed with the patient the risks of risankizumab-rzaa including but not limited to immunosuppression, and serious infections.  The patient understands that monitoring is required including a PPD at baseline and must alert us or the primary physician if symptoms of infection or other concerning signs are noted. Bimzelx Pregnancy And Lactation Text: This medication crosses the placenta and the safety is uncertain during pregnancy. It is unknown if this medication is present in breast milk. Libtayo Counseling- I discussed with the patient the risks of Libtayo including but not limited to nausea, vomiting, diarrhea, and bone or muscle pain.  The patient verbalized understanding of the proper use and possible adverse effects of Libtayo.  All of the patient's questions and concerns were addressed. Azathioprine Counseling:  I discussed with the patient the risks of azathioprine including but not limited to myelosuppression, immunosuppression, hepatotoxicity, lymphoma, and infections.  The patient understands that monitoring is required including baseline LFTs, Creatinine, possible TPMP genotyping and weekly CBCs for the first month and then every 2 weeks thereafter.  The patient verbalized understanding of the proper use and possible adverse effects of azathioprine.  All of the patient's questions and concerns were addressed. Protopic Counseling: Patient may experience a mild burning sensation during topical application. Protopic is not approved in children less than 2 years of age. There have been case reports of hematologic and skin malignancies in patients using topical calcineurin inhibitors although causality is questionable. Winlevi Pregnancy And Lactation Text: This medication is considered safe during pregnancy and breastfeeding. Eucrisa Counseling: Patient may experience a mild burning sensation during topical application. Eucrisa is not approved in children less than 3 months of age. Hydroquinone Counseling:  Patient advised that medication may result in skin irritation, lightening (hypopigmentation), dryness, and burning.  In the event of skin irritation, the patient was advised to reduce the amount of the drug applied or use it less frequently.  Rarely, spots that are treated with hydroquinone can become darker (pseudoochronosis).  Should this occur, patient instructed to stop medication and call the office. The patient verbalized understanding of the proper use and possible adverse effects of hydroquinone.  All of the patient's questions and concerns were addressed. Nsaids Counseling: NSAID Counseling: I discussed with the patient that NSAIDs should be taken with food. Prolonged use of NSAIDs can result in the development of stomach ulcers.  Patient advised to stop taking NSAIDs if abdominal pain occurs.  The patient verbalized understanding of the proper use and possible adverse effects of NSAIDs.  All of the patient's questions and concerns were addressed. Bexarotene Pregnancy And Lactation Text: This medication is Pregnancy Category X and should not be given to women who are pregnant or may become pregnant. This medication should not be used if you are breast feeding. Hyrimoz Counseling:  I discussed with the patient the risks of adalimumab including but not limited to myelosuppression, immunosuppression, autoimmune hepatitis, demyelinating diseases, lymphoma, and serious infections.  The patient understands that monitoring is required including a PPD at baseline and must alert us or the primary physician if symptoms of infection or other concerning signs are noted. Dutasteride Pregnancy And Lactation Text: This medication is absolutely contraindicated in women, especially during pregnancy and breast feeding. Feminization of male fetuses is possible if taking while pregnant. Isotretinoin Pregnancy And Lactation Text: This medication is Pregnancy Category X and is considered extremely dangerous during pregnancy. It is unknown if it is excreted in breast milk. Oral Minoxidil Pregnancy And Lactation Text: This medication should only be used when clearly needed if you are pregnant, attempting to become pregnant or breast feeding. SSKI Counseling:  I discussed with the patient the risks of SSKI including but not limited to thyroid abnormalities, metallic taste, GI upset, fever, headache, acne, arthralgias, paraesthesias, lymphadenopathy, easy bleeding, arrhythmias, and allergic reaction. Cantharidin Counseling:  I discussed with the patient the risks of Cantharidin including but not limited to pain, redness, burning, itching, and blistering. Erythromycin Counseling:  I discussed with the patient the risks of erythromycin including but not limited to GI upset, allergic reaction, drug rash, diarrhea, increase in liver enzymes, and yeast infections. Carac Counseling:  I discussed with the patient the risks of Carac including but not limited to erythema, scaling, itching, weeping, crusting, and pain. Mirvaso Counseling: Mirvaso is a topical medication which can decrease superficial blood flow where applied. Side effects are uncommon and include stinging, redness and allergic reactions. Valtrex Counseling: I discussed with the patient the risks of valacyclovir including but not limited to kidney damage, nausea, vomiting and severe allergy.  The patient understands that if the infection seems to be worsening or is not improving, they are to call. Imiquimod Counseling:  I discussed with the patient the risks of imiquimod including but not limited to erythema, scaling, itching, weeping, crusting, and pain.  Patient understands that the inflammatory response to imiquimod is variable from person to person and was educated regarded proper titration schedule.  If flu-like symptoms develop, patient knows to discontinue the medication and contact us. Adbry Counseling: I discussed with the patient the risks of tralokinumab including but not limited to eye infection and irritation, cold sores, injection site reactions, worsening of asthma, allergic reactions and increased risk of parasitic infection.  Live vaccines should be avoided while taking tralokinumab. The patient understands that monitoring is required and they must alert us or the primary physician if symptoms of infection or other concerning signs are noted. Spevigo Counseling: I discussed with the patient the risks of Spevigo including but not limited to fatigue, nasuea, vomiting, headache, pruritus, urinary tract infection, an infusion related reactions.  The patient understands that monitoring is required including screening for tuberculosis at baseline and yearly screening thereafter while continuing Spevigo therapy. They should contact us if symptoms of infection or other concerning signs are noted. Xolair Pregnancy And Lactation Text: This medication is Pregnancy Category B and is considered safe during pregnancy. This medication is excreted in breast milk. VTAMA Counseling: I discussed with the patient that VTAMA is not for use in the eyes, mouth or mouth. They should call the office if they develop any signs of allergic reactions to VTAMA. The patient verbalized understanding of the proper use and possible adverse effects of VTAMA.  All of the patient's questions and concerns were addressed. Spevigo Pregnancy And Lactation Text: The risk during pregnancy and breastfeeding is uncertain with this medication. This medication does cross the placenta. It is unknown if this medication is found in breast milk. Topical Metronidazole Pregnancy And Lactation Text: This medication is Pregnancy Category B and considered safe during pregnancy.  It is also considered safe to use while breastfeeding. Colchicine Counseling:  Patient counseled regarding adverse effects including but not limited to stomach upset (nausea, vomiting, stomach pain, or diarrhea).  Patient instructed to limit alcohol consumption while taking this medication.  Colchicine may reduce blood counts especially with prolonged use.  The patient understands that monitoring of kidney function and blood counts may be required, especially at baseline. The patient verbalized understanding of the proper use and possible adverse effects of colchicine.  All of the patient's questions and concerns were addressed. Topical Retinoid counseling:  Patient advised to apply a pea-sized amount only at bedtime and wait 30 minutes after washing their face before applying.  If too drying, patient may add a non-comedogenic moisturizer. The patient verbalized understanding of the proper use and possible adverse effects of retinoids.  All of the patient's questions and concerns were addressed. Itraconazole Counseling:  I discussed with the patient the risks of itraconazole including but not limited to liver damage, nausea/vomiting, neuropathy, and severe allergy.  The patient understands that this medication is best absorbed when taken with acidic beverages such as non-diet cola or ginger ale.  The patient understands that monitoring is required including baseline LFTs and repeat LFTs at intervals.  The patient understands that they are to contact us or the primary physician if concerning signs are noted. Metronidazole Counseling:  I discussed with the patient the risks of metronidazole including but not limited to seizures, nausea/vomiting, a metallic taste in the mouth, nausea/vomiting and severe allergy. Ketoconazole Pregnancy And Lactation Text: This medication is Pregnancy Category C and it isn't know if it is safe during pregnancy. It is also excreted in breast milk and breast feeding isn't recommended. Finasteride Female Counseling: Finasteride Counseling:  I discussed with the patient the risks of use of finasteride including but not limited to decreased libido and sexual dysfunction. Explained the teratogenic nature of the medication and stressed the importance of not getting pregnant during treatment. All of the patient's questions and concerns were addressed. Metronidazole Pregnancy And Lactation Text: This medication is Pregnancy Category B and considered safe during pregnancy.  It is also excreted in breast milk. Sski Pregnancy And Lactation Text: This medication is Pregnancy Category D and isn't considered safe during pregnancy. It is excreted in breast milk. Ketoconazole Counseling:   Patient counseled regarding improving absorption with orange juice.  Adverse effects include but are not limited to breast enlargement, headache, diarrhea, nausea, upset stomach, liver function test abnormalities, taste disturbance, and stomach pain.  There is a rare possibility of liver failure that can occur when taking ketoconazole. The patient understands that monitoring of LFTs may be required, especially at baseline. The patient verbalized understanding of the proper use and possible adverse effects of ketoconazole.  All of the patient's questions and concerns were addressed. Klisyri Pregnancy And Lactation Text: It is unknown if this medication can harm a developing fetus or if it is excreted in breast milk. Griseofulvin Pregnancy And Lactation Text: This medication is Pregnancy Category X and is known to cause serious birth defects. It is unknown if this medication is excreted in breast milk but breast feeding should be avoided. Methotrexate Counseling:  Patient counseled regarding adverse effects of methotrexate including but not limited to nausea, vomiting, abnormalities in liver function tests. Patients may develop mouth sores, rash, diarrhea, and abnormalities in blood counts. The patient understands that monitoring is required including LFT's and blood counts.  There is a rare possibility of scarring of the liver and lung problems that can occur when taking methotrexate. Persistent nausea, loss of appetite, pale stools, dark urine, cough, and shortness of breath should be reported immediately. Patient advised to discontinue methotrexate treatment at least three months before attempting to become pregnant.  I discussed the need for folate supplements while taking methotrexate.  These supplements can decrease side effects during methotrexate treatment. The patient verbalized understanding of the proper use and possible adverse effects of methotrexate.  All of the patient's questions and concerns were addressed. Detail Level: Zone Hydroxychloroquine Pregnancy And Lactation Text: This medication has been shown to cause fetal harm but it isn't assigned a Pregnancy Risk Category. There are small amounts excreted in breast milk. Tranexamic Acid Pregnancy And Lactation Text: It is unknown if this medication is safe during pregnancy or breast feeding. Tazorac Counseling:  Patient advised that medication is irritating and drying.  Patient may need to apply sparingly and wash off after an hour before eventually leaving it on overnight.  The patient verbalized understanding of the proper use and possible adverse effects of tazorac.  All of the patient's questions and concerns were addressed. Terbinafine Counseling: Patient counseling regarding adverse effects of terbinafine including but not limited to headache, diarrhea, rash, upset stomach, liver function test abnormalities, itching, taste/smell disturbance, nausea, abdominal pain, and flatulence.  There is a rare possibility of liver failure that can occur when taking terbinafine.  The patient understands that a baseline LFT and kidney function test may be required. The patient verbalized understanding of the proper use and possible adverse effects of terbinafine.  All of the patient's questions and concerns were addressed. Zoryve Counseling:  I discussed with the patient that Zoryve is not for use in the eyes, mouth or vagina. The most commonly reported side effects include diarrhea, headache, insomnia, application site pain, upper respiratory tract infections, and urinary tract infections.  All of the patient's questions and concerns were addressed. Hydroxyzine Pregnancy And Lactation Text: This medication is not safe during pregnancy and should not be taken. It is also excreted in breast milk and breast feeding isn't recommended. Prednisone Pregnancy And Lactation Text: This medication is Pregnancy Category C and it isn't know if it is safe during pregnancy. This medication is excreted in breast milk. Xelisidraz Pregnancy And Lactation Text: This medication is Pregnancy Category D and is not considered safe during pregnancy.  The risk during breast feeding is also uncertain. Azithromycin Counseling:  I discussed with the patient the risks of azithromycin including but not limited to GI upset, allergic reaction, drug rash, diarrhea, and yeast infections. Rituxan Counseling:  I discussed with the patient the risks of Rituxan infusions. Side effects can include infusion reactions, severe drug rashes including mucocutaneous reactions, reactivation of latent hepatitis and other infections and rarely progressive multifocal leukoencephalopathy.  All of the patient's questions and concerns were addressed. Albendazole Counseling:  I discussed with the patient the risks of albendazole including but not limited to cytopenia, kidney damage, nausea/vomiting and severe allergy.  The patient understands that this medication is being used in an off-label manner. Dutasteride Female Counseling: Dutasteride Counseling:  I discussed with the patient the risks of use of dutasteride including but not limited to decreased libido and sexual dysfunction. Explained the teratogenic nature of the medication and stressed the importance of not getting pregnant during treatment. All of the patient's questions and concerns were addressed. Ivermectin Pregnancy And Lactation Text: This medication is Pregnancy Category C and it isn't known if it is safe during pregnancy. It is also excreted in breast milk. Hydroxychloroquine Counseling:  I discussed with the patient that a baseline ophthalmologic exam is needed at the start of therapy and every year thereafter while on therapy. A CBC may also be warranted for monitoring.  The side effects of this medication were discussed with the patient, including but not limited to agranulocytosis, aplastic anemia, seizures, rashes, retinopathy, and liver toxicity. Patient instructed to call the office should any adverse effect occur.  The patient verbalized understanding of the proper use and possible adverse effects of Plaquenil.  All the patient's questions and concerns were addressed. Rifampin Counseling: I discussed with the patient the risks of rifampin including but not limited to liver damage, kidney damage, red-orange body fluids, nausea/vomiting and severe allergy. Quinolones Counseling:  I discussed with the patient the risks of fluoroquinolones including but not limited to GI upset, allergic reaction, drug rash, diarrhea, dizziness, photosensitivity, yeast infections, liver function test abnormalities, tendonitis/tendon rupture. Minoxidil Counseling: Minoxidil is a topical medication which can increase blood flow where it is applied. It is uncertain how this medication increases hair growth. Side effects are uncommon and include stinging and allergic reactions. Griseofulvin Counseling:  I discussed with the patient the risks of griseofulvin including but not limited to photosensitivity, cytopenia, liver damage, nausea/vomiting and severe allergy.  The patient understands that this medication is best absorbed when taken with a fatty meal (e.g., ice cream or french fries). Infliximab Counseling:  I discussed with the patient the risks of infliximab including but not limited to myelosuppression, immunosuppression, autoimmune hepatitis, demyelinating diseases, lymphoma, and serious infections.  The patient understands that monitoring is required including a PPD at baseline and must alert us or the primary physician if symptoms of infection or other concerning signs are noted. Minocycline Counseling: Patient advised regarding possible photosensitivity and discoloration of the teeth, skin, lips, tongue and gums.  Patient instructed to avoid sunlight, if possible.  When exposed to sunlight, patients should wear protective clothing, sunglasses, and sunscreen.  The patient was instructed to call the office immediately if the following severe adverse effects occur:  hearing changes, easy bruising/bleeding, severe headache, or vision changes.  The patient verbalized understanding of the proper use and possible adverse effects of minocycline.  All of the patient's questions and concerns were addressed. Thalidomide Counseling: I discussed with the patient the risks of thalidomide including but not limited to birth defects, anxiety, weakness, chest pain, dizziness, cough and severe allergy. Spironolactone Counseling: Patient advised regarding risks of diarrhea, abdominal pain, hyperkalemia, birth defects (for female patients), liver toxicity and renal toxicity. The patient may need blood work to monitor liver and kidney function and potassium levels while on therapy. The patient verbalized understanding of the proper use and possible adverse effects of spironolactone.  All of the patient's questions and concerns were addressed. Erythromycin Pregnancy And Lactation Text: This medication is Pregnancy Category B and is considered safe during pregnancy. It is also excreted in breast milk. Rifampin Pregnancy And Lactation Text: This medication is Pregnancy Category C and it isn't know if it is safe during pregnancy. It is also excreted in breast milk and should not be used if you are breast feeding. Fluconazole Counseling:  Patient counseled regarding adverse effects of fluconazole including but not limited to headache, diarrhea, nausea, upset stomach, liver function test abnormalities, taste disturbance, and stomach pain.  There is a rare possibility of liver failure that can occur when taking fluconazole.  The patient understands that monitoring of LFTs and kidney function test may be required, especially at baseline. The patient verbalized understanding of the proper use and possible adverse effects of fluconazole.  All of the patient's questions and concerns were addressed. Calcipotriene Counseling:  I discussed with the patient the risks of calcipotriene including but not limited to erythema, scaling, itching, and irritation. Xeljanz Counseling: I discussed with the patient the risks of Xeljanz therapy including increased risk of infection, liver issues, headache, diarrhea, or cold symptoms. Live vaccines should be avoided. They were instructed to call if they have any problems. Birth Control Pills Pregnancy And Lactation Text: This medication should be avoided if pregnant and for the first 30 days post-partum.

## 2024-09-17 NOTE — PROGRESS NOTE ADULT - SUBJECTIVE AND OBJECTIVE BOX
error   Gastroenterolgy  Patient seen and examined bedside resting comfortably.  No complaints offered. No Abdominal pain Denies nausea and vomiting.   NPO on PPN  No flatus/BM.     T(F): 97.8 (09-27-17 @ 05:36), Max: 98.9 (09-26-17 @ 23:43)  HR: 73 (09-27-17 @ 05:36) (71 - 73)  BP: 117/66 (09-27-17 @ 05:36) (107/68 - 125/69)  RR: 16 (09-27-17 @ 05:36) (16 - 18)  SpO2: 98% (09-27-17 @ 05:36) (93% - 98%)  Wt(kg): --  CAPILLARY BLOOD GLUCOSE      PHYSICAL EXAM:  General: NAD, WDWN.   Neuro:  Alert & oriented x 3  HEENT: NCAT, EOMI, conjunctiva clear  CV: +S1+S2 regular rate and rhythm  Lung: clear to ausculation bilaterally, respirations nonlabored, good inspiratory effort  Abdomen: soft, Non tender, No Distension. Normal active BS  Extremities: no pedal edema or calf tenderness noted     LABS:                        15.1   5.6   )-----------( 273      ( 26 Sep 2017 07:04 )             48.5     09-27    139  |  102  |  14  ----------------------------<  88  3.9   |  26  |  1.23    Ca    9.7      27 Sep 2017 07:55  Phos  3.5     09-27  Mg     2.1     09-27          I&O's Detail    09-27 @ 07:55    139 | 102 | 14  /9.7 | 2.1 | 3.5  _______________________/  3.9 | 26 | 1.23                           \par

## 2024-10-22 NOTE — PATIENT PROFILE ADULT. - FUNCTIONAL SCREEN CURRENT LEVEL: DRESSING, MLM
Post-Care Instructions: I reviewed with the patient in detail post-care instructions. Patient is to wear sunprotection, and avoid picking at any of the treated lesions. Pt may apply Vaseline to crusted or scabbing areas.
Duration Of Freeze Thaw-Cycle (Seconds): 0
Show Applicator Variable?: Yes
Render Note In Bullet Format When Appropriate: No
Consent: The patient's consent was obtained including but not limited to risks of crusting, scabbing, blistering, scarring, darker or lighter pigmentary change, recurrence, incomplete removal and infection.
Detail Level: Detailed
(0) independent